# Patient Record
Sex: FEMALE | Race: WHITE | Employment: OTHER | URBAN - METROPOLITAN AREA
[De-identification: names, ages, dates, MRNs, and addresses within clinical notes are randomized per-mention and may not be internally consistent; named-entity substitution may affect disease eponyms.]

---

## 2018-07-03 ENCOUNTER — HOSPITAL ENCOUNTER (OUTPATIENT)
Dept: OTHER | Age: 78
Discharge: HOME | End: 2018-07-31
Attending: INTERNAL MEDICINE | Admitting: INTERNAL MEDICINE

## 2018-07-03 ASSESSMENT — PAIN SCALES - QUEBEC BACK PAIN DISABILITY SCALE
WALK A FEW BLOCKS OR 300 TO 400M: 5
LIFT AND CARRY A HEAVY SUITCASE: 4
RIDE IN A CAR: 2
SIT IN A CHAIR FOR SEVERAL HOURS: 2
MOVE A CHAIR: 2
CARRY TWO BAGS OF GROCERIES: 3
GET OUT OF BED: 1
TOTAL SCORE: 45
WALK SEVERAL KILOMETERS  OR MILES: 5
PUT ON SOCKS OR PANYHOSE: 1
PULL OR PUSH HEAVY DOORS: 3
THROW A BALL: 0
QUEBEC CMS MODIFIER: CK
SLEEP THROUGH THE NIGHT: 0
REACH UP TO HIGH SHELVES: 2
BEND OVER TO CLEAN THE BATHTUB: 2
MAKE YOUR BED: 4
CLIMB ONE FLIGHT OF STAIRS: 0
STAND UP FOR 20 TO 30 MINUTES: 3
RUN ONE BLOCK OR 100M: 5
QUEBEC DISABILITY INDEX: 40-59%
TURN OVER IN BED: 1
TAKE FOOD OUT OF THE REFRIGERATOR: 0

## 2018-07-03 NOTE — PROGRESS NOTES
Physical Therapy  Initial Assessment  Date: 7/3/2018  Patient Name: Shantel Pack  MRN: [id number]  : 1940     Treatment Diagnosis: low back pain, R hip and LE pain; dec core strength; dec ROM/flexibility     Restrictions  Position Activity Restriction  Other position/activity restrictions: low fall risk     Subjective   General  Chart Reviewed: Yes  Patient assessed for rehabilitation services?: Yes  Additional Pertinent Hx: spinal stenosis, L5-S1 Herniation, OA, spondylosis, comp fx t spine; kyphosis; neck problems; osteoporosis   Family / Caregiver Present: No  Referring Practitioner: Mike Brink  Referral Date : 18  Diagnosis: R SIJ dysfunction  PT Visit Information  Onset Date: 04/15/18  PT Insurance Information: medicare  Total # of Visits Approved: 8  Total # of Visits to Date: 1  Progress Note Counter: 1/10  Subjective  Subjective: Pt with h/o ongoing intermittent back pain for years. Recent flare up in April. Intermittent N/T on R but constant on L due to HLD years ago L5-S1. Pain reports pain up to 5-6/10 across LB and into R hip. Relief with heat. Objective     Observation/Palpation  Palpation: no TTP   Observation: thoracic kyphosis     Spine  Lumbar: flex 57 no pain; ext 15 no pain ; L SB 21 no pain;  R SB 16    Strength RLE  Strength RLE: WFL  Comment: bridge - stiffness only   Strength LLE  Strength LLE: WFL     Additional Measures  Flexibility: L HS and SKC max tightness with sharp pains R side, L pirif min tight; R HS -25, SKC and pirif min tightness/uncomfortable   Special Tests: possible post rot R side   Other: man p-tx sore initially but then felt good                       Assessment   Conditions Requiring Skilled Therapeutic Intervention  Body structures, Functions, Activity limitations: Decreased functional mobility ; Decreased ADL status; Decreased ROM; Decreased strength;Decreased high-level IADLs;Decreased balance;Decreased endurance  Assessment: prior level

## 2018-07-05 ENCOUNTER — HOSPITAL ENCOUNTER (OUTPATIENT)
Dept: PHYSICAL THERAPY | Age: 78
Discharge: HOME | End: 2018-07-06
Admitting: INTERNAL MEDICINE

## 2018-07-10 ENCOUNTER — HOSPITAL ENCOUNTER (OUTPATIENT)
Dept: PHYSICAL THERAPY | Age: 78
Discharge: HOME | End: 2018-07-11
Admitting: INTERNAL MEDICINE

## 2018-07-12 ENCOUNTER — HOSPITAL ENCOUNTER (OUTPATIENT)
Dept: PHYSICAL THERAPY | Age: 78
Discharge: HOME | End: 2018-07-13
Admitting: INTERNAL MEDICINE

## 2018-07-12 NOTE — FLOWSHEET NOTE
Physical Therapy Daily Treatment Note  Date:  2018    Patient Name:  Haseeb Jones    :  1940  MRN: [id number]    Restrictions/Precautions: Position Activity Restriction  Other position/activity restrictions: low fall risk     Pertinent Medical History: Additional Pertinent Hx: spinal stenosis, L5-S1 Herniation, OA, spondylosis, comp fx t spine; kyphosis; neck problems; osteoporosis     Medical/Treatment Diagnosis Information:  · Diagnosis: R SIJ dysfunction  · Treatment Diagnosis: low back pain, R hip and LE pain; dec core strength; dec ROM/flexibility     Insurance/Certification information:  PT Insurance Information: medicare  Physician Information:  Referring Practitioner: Amaris Monday  Plan of care signed (Y/N): Faxed to Md    Visit# / total visits:   - G code at 10   Pain level: 3/10     G-Code (if applicable):      Date / Visit # G-Code Applied:  /  PT G-Codes  Functional Assessment Tool Used: [place]  Score: 45/40-59%  Functional Limitation: Changing and maintaining body position  Changing and Maintaining Body Position Current Status (): At least 40 percent but less than 60 percent impaired, limited or restricted  Changing and Maintaining Body Position Goal Status (): At least 20 percent but less than 40 percent impaired, limited or restricted    Progress Note: []  Yes  [x]  No  Next due by: Visit #10      History of Injury: Subjective  Subjective: Pt with h/o ongoing intermittent back pain for years. Recent flare up in April. Intermittent N/T on R but constant on L due to HLD years ago L5-S1. Pain reports pain up to 5-6/10 across LB and into R hip. Relief with heat. Subjective:  Reports R LBP and R lower quadrant pain/ tenderness.      Objective:  Observation:   Test measurements:      Exercises:  Exercise/Equipment Resistance/Repetitions Other comments   MET for R post rot Deferred today        TA  PPT X 5 with deep breathing  X 5 Max cues  Increased pain after ; with

## 2018-07-17 ENCOUNTER — HOSPITAL ENCOUNTER (OUTPATIENT)
Dept: PHYSICAL THERAPY | Age: 78
Discharge: HOME | End: 2018-07-18
Admitting: INTERNAL MEDICINE

## 2018-08-01 ENCOUNTER — HOSPITAL ENCOUNTER (OUTPATIENT)
Dept: OTHER | Age: 78
Discharge: HOME | End: 2018-08-31
Attending: INTERNAL MEDICINE | Admitting: INTERNAL MEDICINE

## 2019-07-19 ENCOUNTER — HOSPITAL ENCOUNTER (EMERGENCY)
Age: 79
Discharge: LEFT BEFORE DISCHARGE | End: 2019-07-19
Attending: EMERGENCY MEDICINE
Payer: MEDICARE

## 2019-07-19 ENCOUNTER — APPOINTMENT (OUTPATIENT)
Dept: GENERAL RADIOLOGY | Age: 79
End: 2019-07-19
Payer: MEDICARE

## 2019-07-19 ENCOUNTER — APPOINTMENT (OUTPATIENT)
Dept: CT IMAGING | Age: 79
End: 2019-07-19
Payer: MEDICARE

## 2019-07-19 VITALS
WEIGHT: 163.58 LBS | DIASTOLIC BLOOD PRESSURE: 76 MMHG | RESPIRATION RATE: 16 BRPM | BODY MASS INDEX: 30.1 KG/M2 | HEART RATE: 75 BPM | HEIGHT: 62 IN | TEMPERATURE: 97.9 F | SYSTOLIC BLOOD PRESSURE: 154 MMHG | OXYGEN SATURATION: 98 %

## 2019-07-19 DIAGNOSIS — S09.90XA INJURY OF HEAD, INITIAL ENCOUNTER: Primary | ICD-10-CM

## 2019-07-19 PROCEDURE — 72125 CT NECK SPINE W/O DYE: CPT

## 2019-07-19 PROCEDURE — 4500000002 HC ER NO CHARGE

## 2019-07-19 PROCEDURE — 73030 X-RAY EXAM OF SHOULDER: CPT

## 2019-07-19 PROCEDURE — 70450 CT HEAD/BRAIN W/O DYE: CPT

## 2019-07-19 ASSESSMENT — PAIN DESCRIPTION - DESCRIPTORS: DESCRIPTORS: DISCOMFORT

## 2019-07-19 ASSESSMENT — PAIN DESCRIPTION - PAIN TYPE: TYPE: ACUTE PAIN

## 2019-07-19 ASSESSMENT — PAIN DESCRIPTION - LOCATION: LOCATION: HEAD

## 2019-07-19 ASSESSMENT — PAIN SCALES - GENERAL: PAINLEVEL_OUTOF10: 2

## 2019-07-19 NOTE — ED TRIAGE NOTES
Pt to Er states didnt see curb and tripped landing on forehead. 81mg asa daily. +brusing. +abrasion. pt reports chronic neck pain. allergic to tetanus shot.

## 2019-07-22 ASSESSMENT — ENCOUNTER SYMPTOMS
SHORTNESS OF BREATH: 0
ABDOMINAL PAIN: 0
PHOTOPHOBIA: 0
VOICE CHANGE: 0
COLOR CHANGE: 1
VOMITING: 0
NAUSEA: 0

## 2020-06-03 ENCOUNTER — APPOINTMENT (OUTPATIENT)
Dept: CT IMAGING | Age: 80
DRG: 159 | End: 2020-06-03
Payer: MEDICARE

## 2020-06-03 ENCOUNTER — HOSPITAL ENCOUNTER (INPATIENT)
Age: 80
LOS: 2 days | Discharge: ANOTHER HOSPITAL | DRG: 159 | End: 2020-06-05
Attending: FAMILY MEDICINE | Admitting: FAMILY MEDICINE
Payer: MEDICARE

## 2020-06-03 PROBLEM — E78.5 HYPERLIPIDEMIA: Status: ACTIVE | Noted: 2020-06-03

## 2020-06-03 PROBLEM — I10 HYPERTENSION: Status: ACTIVE | Noted: 2020-06-03

## 2020-06-03 PROBLEM — E03.9 HYPOTHYROID: Status: ACTIVE | Noted: 2020-06-03

## 2020-06-03 PROBLEM — L02.91 PHLEGMON: Status: ACTIVE | Noted: 2020-06-03

## 2020-06-03 PROBLEM — K12.2 ORAL ABSCESS: Status: ACTIVE | Noted: 2020-06-03

## 2020-06-03 PROBLEM — R25.2 TRISMUS: Status: ACTIVE | Noted: 2020-06-03

## 2020-06-03 LAB
A/G RATIO: 1.4 (ref 1.1–2.2)
ALBUMIN SERPL-MCNC: 4.4 G/DL (ref 3.4–5)
ALP BLD-CCNC: 81 U/L (ref 40–129)
ALT SERPL-CCNC: 13 U/L (ref 10–40)
ANION GAP SERPL CALCULATED.3IONS-SCNC: 14 MMOL/L (ref 3–16)
AST SERPL-CCNC: 15 U/L (ref 15–37)
BASOPHILS ABSOLUTE: 0.1 K/UL (ref 0–0.2)
BASOPHILS RELATIVE PERCENT: 0.6 %
BILIRUB SERPL-MCNC: 1.4 MG/DL (ref 0–1)
BUN BLDV-MCNC: 14 MG/DL (ref 7–20)
CALCIUM SERPL-MCNC: 10.1 MG/DL (ref 8.3–10.6)
CHLORIDE BLD-SCNC: 99 MMOL/L (ref 99–110)
CO2: 26 MMOL/L (ref 21–32)
CREAT SERPL-MCNC: 0.6 MG/DL (ref 0.6–1.2)
EOSINOPHILS ABSOLUTE: 0 K/UL (ref 0–0.6)
EOSINOPHILS RELATIVE PERCENT: 0.1 %
GFR AFRICAN AMERICAN: >60
GFR NON-AFRICAN AMERICAN: >60
GLOBULIN: 3.1 G/DL
GLUCOSE BLD-MCNC: 112 MG/DL (ref 70–99)
HCT VFR BLD CALC: 40.2 % (ref 36–48)
HEMOGLOBIN: 13.7 G/DL (ref 12–16)
LACTIC ACID, SEPSIS: 0.9 MMOL/L (ref 0.4–1.9)
LYMPHOCYTES ABSOLUTE: 2.3 K/UL (ref 1–5.1)
LYMPHOCYTES RELATIVE PERCENT: 21.2 %
MCH RBC QN AUTO: 31 PG (ref 26–34)
MCHC RBC AUTO-ENTMCNC: 34 G/DL (ref 31–36)
MCV RBC AUTO: 91.2 FL (ref 80–100)
MONOCYTES ABSOLUTE: 1 K/UL (ref 0–1.3)
MONOCYTES RELATIVE PERCENT: 9.2 %
NEUTROPHILS ABSOLUTE: 7.6 K/UL (ref 1.7–7.7)
NEUTROPHILS RELATIVE PERCENT: 68.9 %
PDW BLD-RTO: 13.2 % (ref 12.4–15.4)
PLATELET # BLD: 234 K/UL (ref 135–450)
PMV BLD AUTO: 7.8 FL (ref 5–10.5)
POTASSIUM SERPL-SCNC: 4.8 MMOL/L (ref 3.5–5.1)
RBC # BLD: 4.41 M/UL (ref 4–5.2)
SODIUM BLD-SCNC: 139 MMOL/L (ref 136–145)
TOTAL PROTEIN: 7.5 G/DL (ref 6.4–8.2)
WBC # BLD: 11 K/UL (ref 4–11)

## 2020-06-03 PROCEDURE — 96375 TX/PRO/DX INJ NEW DRUG ADDON: CPT

## 2020-06-03 PROCEDURE — 6360000002 HC RX W HCPCS: Performed by: NURSE PRACTITIONER

## 2020-06-03 PROCEDURE — 1200000000 HC SEMI PRIVATE

## 2020-06-03 PROCEDURE — 83605 ASSAY OF LACTIC ACID: CPT

## 2020-06-03 PROCEDURE — 99284 EMERGENCY DEPT VISIT MOD MDM: CPT

## 2020-06-03 PROCEDURE — 6360000002 HC RX W HCPCS: Performed by: FAMILY MEDICINE

## 2020-06-03 PROCEDURE — 2500000003 HC RX 250 WO HCPCS: Performed by: NURSE PRACTITIONER

## 2020-06-03 PROCEDURE — 70491 CT SOFT TISSUE NECK W/DYE: CPT

## 2020-06-03 PROCEDURE — 80053 COMPREHEN METABOLIC PANEL: CPT

## 2020-06-03 PROCEDURE — 85025 COMPLETE CBC W/AUTO DIFF WBC: CPT

## 2020-06-03 PROCEDURE — 6360000004 HC RX CONTRAST MEDICATION: Performed by: NURSE PRACTITIONER

## 2020-06-03 PROCEDURE — 6370000000 HC RX 637 (ALT 250 FOR IP): Performed by: FAMILY MEDICINE

## 2020-06-03 PROCEDURE — 2580000003 HC RX 258: Performed by: NURSE PRACTITIONER

## 2020-06-03 PROCEDURE — 2580000003 HC RX 258: Performed by: FAMILY MEDICINE

## 2020-06-03 PROCEDURE — 94761 N-INVAS EAR/PLS OXIMETRY MLT: CPT

## 2020-06-03 PROCEDURE — 96365 THER/PROPH/DIAG IV INF INIT: CPT

## 2020-06-03 PROCEDURE — 2500000003 HC RX 250 WO HCPCS: Performed by: FAMILY MEDICINE

## 2020-06-03 PROCEDURE — 87040 BLOOD CULTURE FOR BACTERIA: CPT

## 2020-06-03 PROCEDURE — 2700000000 HC OXYGEN THERAPY PER DAY

## 2020-06-03 PROCEDURE — 36415 COLL VENOUS BLD VENIPUNCTURE: CPT

## 2020-06-03 RX ORDER — ATORVASTATIN CALCIUM 40 MG/1
40 TABLET, FILM COATED ORAL DAILY
Status: DISCONTINUED | OUTPATIENT
Start: 2020-06-03 | End: 2020-06-05 | Stop reason: HOSPADM

## 2020-06-03 RX ORDER — SPIRONOLACTONE 25 MG/1
25 TABLET ORAL 2 TIMES DAILY
Status: DISCONTINUED | OUTPATIENT
Start: 2020-06-03 | End: 2020-06-05 | Stop reason: HOSPADM

## 2020-06-03 RX ORDER — ACETAMINOPHEN 650 MG/1
650 SUPPOSITORY RECTAL EVERY 6 HOURS PRN
Status: DISCONTINUED | OUTPATIENT
Start: 2020-06-03 | End: 2020-06-05 | Stop reason: HOSPADM

## 2020-06-03 RX ORDER — POLYVINYL ALCOHOL 14 MG/ML
1 SOLUTION/ DROPS OPHTHALMIC PRN
Status: DISCONTINUED | OUTPATIENT
Start: 2020-06-03 | End: 2020-06-05 | Stop reason: HOSPADM

## 2020-06-03 RX ORDER — POLYETHYLENE GLYCOL 3350 17 G/17G
17 POWDER, FOR SOLUTION ORAL DAILY PRN
Status: DISCONTINUED | OUTPATIENT
Start: 2020-06-03 | End: 2020-06-05 | Stop reason: HOSPADM

## 2020-06-03 RX ORDER — LATANOPROST 50 UG/ML
1 SOLUTION/ DROPS OPHTHALMIC NIGHTLY
COMMUNITY

## 2020-06-03 RX ORDER — CLINDAMYCIN PHOSPHATE 600 MG/50ML
600 INJECTION INTRAVENOUS ONCE
Status: COMPLETED | OUTPATIENT
Start: 2020-06-03 | End: 2020-06-03

## 2020-06-03 RX ORDER — DEXAMETHASONE SODIUM PHOSPHATE 10 MG/ML
10 INJECTION INTRAMUSCULAR; INTRAVENOUS ONCE
Status: COMPLETED | OUTPATIENT
Start: 2020-06-03 | End: 2020-06-03

## 2020-06-03 RX ORDER — VALSARTAN 80 MG/1
80 TABLET ORAL DAILY
Status: DISCONTINUED | OUTPATIENT
Start: 2020-06-03 | End: 2020-06-05 | Stop reason: HOSPADM

## 2020-06-03 RX ORDER — SODIUM CHLORIDE 0.9 % (FLUSH) 0.9 %
10 SYRINGE (ML) INJECTION PRN
Status: DISCONTINUED | OUTPATIENT
Start: 2020-06-03 | End: 2020-06-05 | Stop reason: HOSPADM

## 2020-06-03 RX ORDER — ASPIRIN 81 MG/1
81 TABLET ORAL DAILY
Status: DISCONTINUED | OUTPATIENT
Start: 2020-06-03 | End: 2020-06-05 | Stop reason: HOSPADM

## 2020-06-03 RX ORDER — ACETAMINOPHEN 325 MG/1
650 TABLET ORAL EVERY 6 HOURS PRN
Status: DISCONTINUED | OUTPATIENT
Start: 2020-06-03 | End: 2020-06-05 | Stop reason: HOSPADM

## 2020-06-03 RX ORDER — LEVOTHYROXINE SODIUM 88 UG/1
88 TABLET ORAL DAILY
COMMUNITY

## 2020-06-03 RX ORDER — CLINDAMYCIN PHOSPHATE 600 MG/50ML
600 INJECTION INTRAVENOUS EVERY 8 HOURS
Status: DISCONTINUED | OUTPATIENT
Start: 2020-06-03 | End: 2020-06-05 | Stop reason: HOSPADM

## 2020-06-03 RX ORDER — ONDANSETRON 2 MG/ML
4 INJECTION INTRAMUSCULAR; INTRAVENOUS EVERY 30 MIN PRN
Status: DISCONTINUED | OUTPATIENT
Start: 2020-06-03 | End: 2020-06-03

## 2020-06-03 RX ORDER — MORPHINE SULFATE 2 MG/ML
2 INJECTION, SOLUTION INTRAMUSCULAR; INTRAVENOUS ONCE
Status: COMPLETED | OUTPATIENT
Start: 2020-06-03 | End: 2020-06-03

## 2020-06-03 RX ORDER — LATANOPROST 50 UG/ML
1 SOLUTION/ DROPS OPHTHALMIC NIGHTLY
Status: DISCONTINUED | OUTPATIENT
Start: 2020-06-03 | End: 2020-06-05 | Stop reason: HOSPADM

## 2020-06-03 RX ORDER — ATORVASTATIN CALCIUM 40 MG/1
40 TABLET, FILM COATED ORAL NIGHTLY
COMMUNITY

## 2020-06-03 RX ORDER — SODIUM CHLORIDE 0.9 % (FLUSH) 0.9 %
10 SYRINGE (ML) INJECTION EVERY 12 HOURS SCHEDULED
Status: DISCONTINUED | OUTPATIENT
Start: 2020-06-03 | End: 2020-06-05 | Stop reason: HOSPADM

## 2020-06-03 RX ORDER — KETOROLAC TROMETHAMINE 15 MG/ML
15 INJECTION, SOLUTION INTRAMUSCULAR; INTRAVENOUS ONCE
Status: COMPLETED | OUTPATIENT
Start: 2020-06-03 | End: 2020-06-03

## 2020-06-03 RX ORDER — ONDANSETRON 2 MG/ML
4 INJECTION INTRAMUSCULAR; INTRAVENOUS EVERY 6 HOURS PRN
Status: DISCONTINUED | OUTPATIENT
Start: 2020-06-03 | End: 2020-06-05 | Stop reason: HOSPADM

## 2020-06-03 RX ORDER — LEVOTHYROXINE SODIUM 88 UG/1
88 TABLET ORAL DAILY
Status: DISCONTINUED | OUTPATIENT
Start: 2020-06-04 | End: 2020-06-05 | Stop reason: HOSPADM

## 2020-06-03 RX ORDER — 0.9 % SODIUM CHLORIDE 0.9 %
1000 INTRAVENOUS SOLUTION INTRAVENOUS ONCE
Status: COMPLETED | OUTPATIENT
Start: 2020-06-03 | End: 2020-06-03

## 2020-06-03 RX ORDER — SODIUM CHLORIDE 9 MG/ML
INJECTION, SOLUTION INTRAVENOUS CONTINUOUS
Status: DISCONTINUED | OUTPATIENT
Start: 2020-06-04 | End: 2020-06-05 | Stop reason: HOSPADM

## 2020-06-03 RX ORDER — VALSARTAN 80 MG/1
80 TABLET ORAL DAILY
COMMUNITY

## 2020-06-03 RX ORDER — ACETAMINOPHEN 500 MG
1000 TABLET ORAL ONCE
Status: DISCONTINUED | OUTPATIENT
Start: 2020-06-03 | End: 2020-06-05 | Stop reason: HOSPADM

## 2020-06-03 RX ORDER — MORPHINE SULFATE 4 MG/ML
4 INJECTION, SOLUTION INTRAMUSCULAR; INTRAVENOUS ONCE
Status: COMPLETED | OUTPATIENT
Start: 2020-06-03 | End: 2020-06-03

## 2020-06-03 RX ORDER — ASPIRIN 81 MG/1
81 TABLET ORAL DAILY
COMMUNITY

## 2020-06-03 RX ORDER — PROMETHAZINE HYDROCHLORIDE 25 MG/1
12.5 TABLET ORAL EVERY 6 HOURS PRN
Status: DISCONTINUED | OUTPATIENT
Start: 2020-06-03 | End: 2020-06-05 | Stop reason: HOSPADM

## 2020-06-03 RX ORDER — SPIRONOLACTONE 25 MG/1
25 TABLET ORAL 2 TIMES DAILY
COMMUNITY

## 2020-06-03 RX ADMIN — CLINDAMYCIN PHOSPHATE 600 MG: 600 INJECTION, SOLUTION INTRAVENOUS at 14:43

## 2020-06-03 RX ADMIN — ONDANSETRON 4 MG: 2 INJECTION INTRAMUSCULAR; INTRAVENOUS at 21:52

## 2020-06-03 RX ADMIN — ATORVASTATIN CALCIUM 40 MG: 40 TABLET, FILM COATED ORAL at 20:40

## 2020-06-03 RX ADMIN — ASPIRIN 81 MG: 81 TABLET, COATED ORAL at 20:40

## 2020-06-03 RX ADMIN — CLINDAMYCIN PHOSPHATE 600 MG: 600 INJECTION, SOLUTION INTRAVENOUS at 21:52

## 2020-06-03 RX ADMIN — KETOROLAC TROMETHAMINE 15 MG: 15 INJECTION, SOLUTION INTRAMUSCULAR; INTRAVENOUS at 21:52

## 2020-06-03 RX ADMIN — MORPHINE SULFATE 2 MG: 2 INJECTION, SOLUTION INTRAMUSCULAR; INTRAVENOUS at 22:01

## 2020-06-03 RX ADMIN — DEXAMETHASONE SODIUM PHOSPHATE 10 MG: 10 INJECTION INTRAMUSCULAR; INTRAVENOUS at 21:52

## 2020-06-03 RX ADMIN — ONDANSETRON 4 MG: 2 INJECTION, SOLUTION INTRAMUSCULAR; INTRAVENOUS at 14:27

## 2020-06-03 RX ADMIN — Medication 10 ML: at 20:41

## 2020-06-03 RX ADMIN — MORPHINE SULFATE 4 MG: 4 INJECTION, SOLUTION INTRAMUSCULAR; INTRAVENOUS at 17:38

## 2020-06-03 RX ADMIN — SPIRONOLACTONE 25 MG: 25 TABLET ORAL at 20:40

## 2020-06-03 RX ADMIN — IOPAMIDOL 100 ML: 755 INJECTION, SOLUTION INTRAVENOUS at 15:25

## 2020-06-03 RX ADMIN — ENOXAPARIN SODIUM 40 MG: 40 INJECTION SUBCUTANEOUS at 20:41

## 2020-06-03 RX ADMIN — LATANOPROST 1 DROP: 50 SOLUTION OPHTHALMIC at 21:52

## 2020-06-03 RX ADMIN — VALSARTAN 80 MG: 80 TABLET, FILM COATED ORAL at 20:40

## 2020-06-03 RX ADMIN — SODIUM CHLORIDE 1000 ML: 9 INJECTION, SOLUTION INTRAVENOUS at 14:27

## 2020-06-03 RX ADMIN — MORPHINE SULFATE 4 MG: 4 INJECTION, SOLUTION INTRAMUSCULAR; INTRAVENOUS at 14:31

## 2020-06-03 ASSESSMENT — PAIN DESCRIPTION - LOCATION
LOCATION: MOUTH
LOCATION: MOUTH;JAW
LOCATION: JAW;MOUTH

## 2020-06-03 ASSESSMENT — ENCOUNTER SYMPTOMS
NAUSEA: 0
DIARRHEA: 0
SHORTNESS OF BREATH: 0
VOMITING: 0
SORE THROAT: 1
CHEST TIGHTNESS: 0
ABDOMINAL PAIN: 0
TROUBLE SWALLOWING: 1
FACIAL SWELLING: 1

## 2020-06-03 ASSESSMENT — PAIN DESCRIPTION - ORIENTATION
ORIENTATION: RIGHT

## 2020-06-03 ASSESSMENT — PAIN SCALES - GENERAL
PAINLEVEL_OUTOF10: 10
PAINLEVEL_OUTOF10: 9
PAINLEVEL_OUTOF10: 10

## 2020-06-03 ASSESSMENT — PAIN DESCRIPTION - FREQUENCY
FREQUENCY: CONTINUOUS
FREQUENCY: CONTINUOUS

## 2020-06-03 ASSESSMENT — PAIN DESCRIPTION - PAIN TYPE
TYPE: ACUTE PAIN

## 2020-06-03 ASSESSMENT — PAIN DESCRIPTION - PROGRESSION
CLINICAL_PROGRESSION: NOT CHANGED
CLINICAL_PROGRESSION: GRADUALLY WORSENING
CLINICAL_PROGRESSION: NOT CHANGED

## 2020-06-03 ASSESSMENT — PAIN DESCRIPTION - DESCRIPTORS
DESCRIPTORS: THROBBING;ACHING
DESCRIPTORS: ACHING;THROBBING
DESCRIPTORS: ACHING;THROBBING

## 2020-06-03 ASSESSMENT — PAIN - FUNCTIONAL ASSESSMENT
PAIN_FUNCTIONAL_ASSESSMENT: ACTIVITIES ARE NOT PREVENTED
PAIN_FUNCTIONAL_ASSESSMENT: ACTIVITIES ARE NOT PREVENTED

## 2020-06-03 ASSESSMENT — PAIN DESCRIPTION - ONSET
ONSET: ON-GOING
ONSET: ON-GOING
ONSET: PROGRESSIVE

## 2020-06-03 NOTE — ED PROVIDER NOTES
Review of Systems   Constitutional: Positive for chills. Negative for activity change and fever. HENT: Positive for dental problem, facial swelling, sore throat and trouble swallowing. Respiratory: Negative for chest tightness and shortness of breath. Cardiovascular: Negative for chest pain. Gastrointestinal: Negative for abdominal pain, diarrhea, nausea and vomiting. Genitourinary: Negative for dysuria. All other systems reviewed and are negative. Positives and Pertinent negatives as per HPI. Except as noted above in the ROS, all other systems were reviewed and negative. PAST MEDICAL HISTORY     Past Medical History:   Diagnosis Date    Hyperlipidemia     Hypertension     Hypothyroid          SURGICAL HISTORY   No past surgical history on file. CURRENTMEDICATIONS       Previous Medications    ASPIRIN 81 MG EC TABLET    Take 81 mg by mouth daily    ATORVASTATIN (LIPITOR) 40 MG TABLET    Take 40 mg by mouth daily    LATANOPROST (XALATAN) 0.005 % OPHTHALMIC SOLUTION    Place 1 drop into the right eye nightly    LEVOTHYROXINE (SYNTHROID) 88 MCG TABLET    Take 88 mcg by mouth Daily    SPIRONOLACTONE (ALDACTONE) 25 MG TABLET    Take 25 mg by mouth 2 times daily Indications: not sure of dose    VALSARTAN (DIOVAN) 80 MG TABLET    Take 80 mg by mouth daily         ALLERGIES     Flu virus vaccine; Penicillins; Tetanus toxoid, adsorbed; Epinephrine; and Lisinopril    FAMILYHISTORY     No family history on file.        SOCIAL HISTORY       Social History     Tobacco Use    Smoking status: Never Smoker    Smokeless tobacco: Never Used   Substance Use Topics    Alcohol use: Yes     Comment: occ    Drug use: Never       SCREENINGS             PHYSICAL EXAM    (up to 7 for level 4, 8 or more for level 5)     ED Triage Vitals [06/03/20 1337]   BP Temp Temp Source Pulse Resp SpO2 Height Weight   136/65 99.1 °F (37.3 °C) Oral 74 18 96 % 5' 2\" (1.575 m) 160 lb 15 oz (73 kg)       Physical Exam  Vitals signs and nursing note reviewed. Constitutional:       Appearance: She is well-developed. She is not diaphoretic. HENT:      Head: Normocephalic and atraumatic. Jaw: Trismus, tenderness, swelling and pain on movement present. Right Ear: Tympanic membrane and external ear normal.      Left Ear: Tympanic membrane and external ear normal.   Eyes:      General:         Right eye: No discharge. Left eye: No discharge. Neck:      Musculoskeletal: Normal range of motion and neck supple. Vascular: No JVD. Cardiovascular:      Rate and Rhythm: Normal rate and regular rhythm. Pulses: Normal pulses. Heart sounds: Normal heart sounds. Pulmonary:      Effort: Pulmonary effort is normal. No respiratory distress. Breath sounds: Normal breath sounds. Abdominal:      Palpations: Abdomen is soft. Musculoskeletal: Normal range of motion. Skin:     General: Skin is warm and dry. Coloration: Skin is not pale. Neurological:      Mental Status: She is alert and oriented to person, place, and time.    Psychiatric:         Behavior: Behavior normal.         DIAGNOSTIC RESULTS   LABS:    Labs Reviewed   COMPREHENSIVE METABOLIC PANEL - Abnormal; Notable for the following components:       Result Value    Glucose 112 (*)     Total Bilirubin 1.4 (*)     All other components within normal limits    Narrative:     Performed at:  [facility]  [address] [place], [facility]   Phone [phone]   CULTURE, BLOOD 1   CULTURE, BLOOD 2   CBC WITH AUTO DIFFERENTIAL    Narrative:     Performed at:  [facility]  [address] [place], [facility]   Phone [phone]   LACTATE, SEPSIS    Narrative:     Performed at:  [address] [place] [address] Laboratory  [address] [place], [facility]   Phone [phone]   LACTATE, SEPSIS       All other labs were within normal range or not returned as of this dictation. EKG: All EKG's are interpreted by the Emergency Department Physician in the absence of a cardiologist.  Please see their note for interpretation of EKG. RADIOLOGY:   Non-plain film images such as CT, Ultrasound and MRI are read by the radiologist. Plain radiographic images are visualized and preliminarily interpreted by the ED Provider with the below findings:        Interpretation per the Radiologist below, if available at the time of this note:    CT SOFT TISSUE NECK W CONTRAST   Final Result   Soft tissue phlegmon along the lingual aspect of the right mandibular angle   and ramus. No discrete rim enhancing fluid collection to suggest drainable   abscess. No results found.         PROCEDURES   Unless otherwise noted below, none     Procedures    CRITICAL CARE TIME   n/a    CONSULTS:  None      EMERGENCY DEPARTMENT COURSE and DIFFERENTIAL DIAGNOSIS/MDM:   Vitals:    Vitals:    06/03/20 1337 06/03/20 1503 06/03/20 1805   BP: 136/65 (!) 142/63 128/74   Pulse: 74 78 68   Resp: 18  16   Temp: 99.1 °F (37.3 °C)  99.3 °F (37.4 °C)   TempSrc: Oral  Oral   SpO2: 96%  96%   Weight: 160 lb 15 oz (73 kg)     Height: 5' 2\" (1.575 m)         Patient was given the following medications:  Medications   ondansetron (ZOFRAN) injection 4 mg (4 mg Intravenous Given 6/3/20 1427)   acetaminophen (TYLENOL) tablet 1,000 mg (1,000 mg Oral Not Given 6/3/20 1803)   clindamycin (CLEOCIN) 600 mg in dextrose 5 % 50 mL IVPB (0 mg Intravenous Stopped 6/3/20 1515)   morphine (PF) injection 4 mg (4 mg Intravenous Given 6/3/20 1431)   0.9 % sodium chloride bolus (0 mLs Intravenous Stopped 6/3/20 1634)   iopamidol (ISOVUE-370) 76 % injection 100 mL (100 mLs Intravenous Given 6/3/20 1525)   morphine (PF) injection 4 mg (4 mg Intravenous Given 6/3/20 1738)       Briefly, this is a 79year old female that presents to the emergency department today with complaint of right-sided

## 2020-06-03 NOTE — ED NOTES
Transferred to [facility] by [employer]  transport with belongings at side.    IV site unremarkable   Pain 9/10     West Arias RN  06/03/20 3164

## 2020-06-04 LAB
ANION GAP SERPL CALCULATED.3IONS-SCNC: 13 MMOL/L (ref 3–16)
BASOPHILS ABSOLUTE: 0 K/UL (ref 0–0.2)
BASOPHILS RELATIVE PERCENT: 0.2 %
BUN BLDV-MCNC: 17 MG/DL (ref 7–20)
CALCIUM SERPL-MCNC: 9.2 MG/DL (ref 8.3–10.6)
CHLORIDE BLD-SCNC: 101 MMOL/L (ref 99–110)
CO2: 24 MMOL/L (ref 21–32)
CREAT SERPL-MCNC: 0.7 MG/DL (ref 0.6–1.2)
EOSINOPHILS ABSOLUTE: 0 K/UL (ref 0–0.6)
EOSINOPHILS RELATIVE PERCENT: 0 %
GFR AFRICAN AMERICAN: >60
GFR NON-AFRICAN AMERICAN: >60
GLUCOSE BLD-MCNC: 140 MG/DL (ref 70–99)
HCT VFR BLD CALC: 38.9 % (ref 36–48)
HEMOGLOBIN: 13.3 G/DL (ref 12–16)
LYMPHOCYTES ABSOLUTE: 1.5 K/UL (ref 1–5.1)
LYMPHOCYTES RELATIVE PERCENT: 17.2 %
MCH RBC QN AUTO: 31.1 PG (ref 26–34)
MCHC RBC AUTO-ENTMCNC: 34.1 G/DL (ref 31–36)
MCV RBC AUTO: 91.3 FL (ref 80–100)
MONOCYTES ABSOLUTE: 0.1 K/UL (ref 0–1.3)
MONOCYTES RELATIVE PERCENT: 1 %
NEUTROPHILS ABSOLUTE: 7.3 K/UL (ref 1.7–7.7)
NEUTROPHILS RELATIVE PERCENT: 81.6 %
PDW BLD-RTO: 12.9 % (ref 12.4–15.4)
PLATELET # BLD: 240 K/UL (ref 135–450)
PMV BLD AUTO: 7.4 FL (ref 5–10.5)
POTASSIUM REFLEX MAGNESIUM: 4.6 MMOL/L (ref 3.5–5.1)
RBC # BLD: 4.26 M/UL (ref 4–5.2)
SODIUM BLD-SCNC: 138 MMOL/L (ref 136–145)
WBC # BLD: 9 K/UL (ref 4–11)

## 2020-06-04 PROCEDURE — 2580000003 HC RX 258: Performed by: NURSE PRACTITIONER

## 2020-06-04 PROCEDURE — 2500000003 HC RX 250 WO HCPCS: Performed by: FAMILY MEDICINE

## 2020-06-04 PROCEDURE — 6370000000 HC RX 637 (ALT 250 FOR IP): Performed by: FAMILY MEDICINE

## 2020-06-04 PROCEDURE — 85025 COMPLETE CBC W/AUTO DIFF WBC: CPT

## 2020-06-04 PROCEDURE — 6360000002 HC RX W HCPCS: Performed by: FAMILY MEDICINE

## 2020-06-04 PROCEDURE — 94761 N-INVAS EAR/PLS OXIMETRY MLT: CPT

## 2020-06-04 PROCEDURE — 1200000000 HC SEMI PRIVATE

## 2020-06-04 PROCEDURE — 6370000000 HC RX 637 (ALT 250 FOR IP): Performed by: INTERNAL MEDICINE

## 2020-06-04 PROCEDURE — 99221 1ST HOSP IP/OBS SF/LOW 40: CPT | Performed by: OTOLARYNGOLOGY

## 2020-06-04 PROCEDURE — 2700000000 HC OXYGEN THERAPY PER DAY

## 2020-06-04 PROCEDURE — 80048 BASIC METABOLIC PNL TOTAL CA: CPT

## 2020-06-04 PROCEDURE — 36415 COLL VENOUS BLD VENIPUNCTURE: CPT

## 2020-06-04 RX ORDER — MORPHINE SULFATE 4 MG/ML
4 INJECTION, SOLUTION INTRAMUSCULAR; INTRAVENOUS ONCE
Status: COMPLETED | OUTPATIENT
Start: 2020-06-04 | End: 2020-06-05

## 2020-06-04 RX ORDER — HYDROCODONE BITARTRATE AND ACETAMINOPHEN 5; 325 MG/1; MG/1
1 TABLET ORAL EVERY 6 HOURS PRN
Status: DISCONTINUED | OUTPATIENT
Start: 2020-06-04 | End: 2020-06-05 | Stop reason: HOSPADM

## 2020-06-04 RX ADMIN — SPIRONOLACTONE 25 MG: 25 TABLET ORAL at 10:46

## 2020-06-04 RX ADMIN — SODIUM CHLORIDE: 9 INJECTION, SOLUTION INTRAVENOUS at 00:59

## 2020-06-04 RX ADMIN — CLINDAMYCIN PHOSPHATE 600 MG: 600 INJECTION, SOLUTION INTRAVENOUS at 06:02

## 2020-06-04 RX ADMIN — SPIRONOLACTONE 25 MG: 25 TABLET ORAL at 17:36

## 2020-06-04 RX ADMIN — HYDROCODONE BITARTRATE AND ACETAMINOPHEN 1 TABLET: 5; 325 TABLET ORAL at 17:36

## 2020-06-04 RX ADMIN — ASPIRIN 81 MG: 81 TABLET, COATED ORAL at 10:46

## 2020-06-04 RX ADMIN — VALSARTAN 80 MG: 80 TABLET, FILM COATED ORAL at 10:45

## 2020-06-04 RX ADMIN — CLINDAMYCIN PHOSPHATE 600 MG: 600 INJECTION, SOLUTION INTRAVENOUS at 15:11

## 2020-06-04 RX ADMIN — LEVOTHYROXINE SODIUM 88 MCG: 0.09 TABLET ORAL at 06:02

## 2020-06-04 RX ADMIN — ENOXAPARIN SODIUM 40 MG: 40 INJECTION SUBCUTANEOUS at 22:44

## 2020-06-04 RX ADMIN — SODIUM CHLORIDE: 9 INJECTION, SOLUTION INTRAVENOUS at 13:45

## 2020-06-04 RX ADMIN — CLINDAMYCIN PHOSPHATE 600 MG: 600 INJECTION, SOLUTION INTRAVENOUS at 22:44

## 2020-06-04 RX ADMIN — ATORVASTATIN CALCIUM 40 MG: 40 TABLET, FILM COATED ORAL at 10:45

## 2020-06-04 RX ADMIN — ONDANSETRON 4 MG: 2 INJECTION INTRAMUSCULAR; INTRAVENOUS at 15:14

## 2020-06-04 RX ADMIN — LATANOPROST 1 DROP: 50 SOLUTION OPHTHALMIC at 22:44

## 2020-06-04 RX ADMIN — ACETAMINOPHEN 650 MG: 325 TABLET, FILM COATED ORAL at 22:50

## 2020-06-04 ASSESSMENT — PAIN DESCRIPTION - PROGRESSION
CLINICAL_PROGRESSION: NOT CHANGED
CLINICAL_PROGRESSION: NOT CHANGED

## 2020-06-04 ASSESSMENT — PAIN DESCRIPTION - FREQUENCY
FREQUENCY: CONTINUOUS
FREQUENCY: CONTINUOUS

## 2020-06-04 ASSESSMENT — PAIN DESCRIPTION - PAIN TYPE
TYPE: ACUTE PAIN
TYPE: ACUTE PAIN

## 2020-06-04 ASSESSMENT — PAIN DESCRIPTION - ORIENTATION
ORIENTATION: RIGHT
ORIENTATION: RIGHT

## 2020-06-04 ASSESSMENT — PAIN - FUNCTIONAL ASSESSMENT
PAIN_FUNCTIONAL_ASSESSMENT: PREVENTS OR INTERFERES SOME ACTIVE ACTIVITIES AND ADLS
PAIN_FUNCTIONAL_ASSESSMENT: PREVENTS OR INTERFERES SOME ACTIVE ACTIVITIES AND ADLS

## 2020-06-04 ASSESSMENT — PAIN SCALES - GENERAL
PAINLEVEL_OUTOF10: 9
PAINLEVEL_OUTOF10: 9

## 2020-06-04 ASSESSMENT — PAIN DESCRIPTION - LOCATION
LOCATION: JAW
LOCATION: JAW

## 2020-06-04 ASSESSMENT — PAIN DESCRIPTION - DESCRIPTORS
DESCRIPTORS: SHARP
DESCRIPTORS: SHARP

## 2020-06-04 ASSESSMENT — PAIN DESCRIPTION - ONSET
ONSET: ON-GOING
ONSET: ON-GOING

## 2020-06-04 NOTE — PROGRESS NOTES
Hospitalist Progress Note      PCP: Brennon Evreett    Date of Admission: 6/3/2020    Chief Complaint: mouth and neck pain    Hospital Course: 80-year-old female presented to the hospital with right-sided jaw pain along with some neck pain. CT of the soft tissue neck showed  phlegmon along the right mandibular angle and ramus    Subjective: Patient states that she feels much better since being on the antibiotics. States that the pain is gone and she can finally open her mouth up as well. Denies any nausea or vomiting. Medications:  Reviewed    Infusion Medications    sodium chloride 75 mL/hr at 06/04/20 0059     Scheduled Medications    acetaminophen  1,000 mg Oral Once    aspirin  81 mg Oral Daily    atorvastatin  40 mg Oral Daily    latanoprost  1 drop Right Eye Nightly    levothyroxine  88 mcg Oral Daily    spironolactone  25 mg Oral BID    valsartan  80 mg Oral Daily    sodium chloride flush  10 mL Intravenous 2 times per day    enoxaparin  40 mg Subcutaneous Nightly    clindamycin (CLEOCIN) IV  600 mg Intravenous Q8H     PRN Meds: sodium chloride flush, acetaminophen **OR** acetaminophen, polyethylene glycol, promethazine **OR** ondansetron, polyvinyl alcohol      Intake/Output Summary (Last 24 hours) at 6/4/2020 1305  Last data filed at 6/4/2020 1044  Gross per 24 hour   Intake 829.93 ml   Output --   Net 829.93 ml       Physical Exam Performed:    BP (!) 117/56   Pulse 57   Temp 97.7 °F (36.5 °C) (Oral)   Resp 18   Ht 5' 2\" (1.575 m)   Wt 163 lb 9.3 oz (74.2 kg)   SpO2 93%   BMI 29.92 kg/m²     General appearance: No apparent distress, appears stated age and cooperative. HEENT: Pupils equal, round, and reactive to light. Conjunctivae/corneas clear. Right-sided facial swelling noted over the right mandible: Nontender to palpation. No erythema or active drainage noted  Neck: Supple, with full range of motion. No jugular venous distention. Trachea midline.   Respiratory: Normal respiratory effort. Clear to auscultation, bilaterally without Rales/Wheezes/Rhonchi. Cardiovascular: Regular rate and rhythm with normal S1/S2 without murmurs, rubs or gallops. Abdomen: Soft, non-tender, non-distended with normal bowel sounds. Musculoskeletal: No clubbing, cyanosis or edema bilaterally. Full range of motion without deformity. Skin: Skin color, texture, turgor normal.  No rashes or lesions. Neurologic:  Neurovascularly intact without any focal sensory/motor deficits. Cranial nerves: II-XII intact, grossly non-focal.  Psychiatric: Alert and oriented, thought content appropriate, normal insight  Capillary Refill: Brisk,< 3 seconds   Peripheral Pulses: +2 palpable, equal bilaterally       Labs:   Recent Labs     06/03/20  1426 06/04/20  0532   WBC 11.0 9.0   HGB 13.7 13.3   HCT 40.2 38.9    240     Recent Labs     06/03/20  1426 06/04/20  0532    138   K 4.8 4.6   CL 99 101   CO2 26 24   BUN 14 17   CREATININE 0.6 0.7   CALCIUM 10.1 9.2     Recent Labs     06/03/20  1426   AST 15   ALT 13   BILITOT 1.4*   ALKPHOS 81     No results for input(s): INR in the last 72 hours. No results for input(s): [place] Done in the last 72 hours. Urinalysis:    No results found for: [place], BACTERIA, RBCUA, BLOODU, [address] [place]    Radiology:  CT SOFT TISSUE NECK W CONTRAST   Final Result   Soft tissue phlegmon along the lingual aspect of the right mandibular angle   and ramus. No discrete rim enhancing fluid collection to suggest drainable   abscess.                  Assessment/Plan:    Active Hospital Problems    Diagnosis    Oral abscess [K12.2]    Phlegmon [L02.91]    Trismus [R25.2]    Hyperlipidemia [E78.5]    Hypertension [I10]    Hypothyroid [E03.9]     Phlegmon along the right mandibular angle and ramus  -ENT was consulted who recommended continuing IV antibiotics for now as patient is significantly improved and may not need surgical intervention  -Continue

## 2020-06-04 NOTE — PROGRESS NOTES
Patient arrived on the floor, alert and oriented x4, VSS, sitting up in bed. Patient c/o right sided mouth/tooth pain with pain swallowing. She also states that she is having a little trouble swallowing and it is getting worse. Swelling is visible, O2 WNL. Patient denies n/v, diarrhea, SOB, but states that she does get nausea with the IV antibiotics that were given in the ED. Oriented patient to the room and call light, answered all questions. Patient denies further needs at this time. Bed in lowest position, non-slip socks on, call light within reach. Will continue to monitor pt needs.

## 2020-06-04 NOTE — PLAN OF CARE
Problem: Pain:  Description: Pain management should include both nonpharmacologic and pharmacologic interventions.   Goal: Pain level will decrease  Description: Pain level will decrease  Outcome: Ongoing  Goal: Control of acute pain  Description: Control of acute pain  Outcome: Ongoing  Goal: Control of chronic pain  Description: Control of chronic pain  Outcome: Ongoing  Goal: Patient's pain/discomfort is manageable  Description: Patient's pain/discomfort is manageable  Outcome: Ongoing     Problem: Infection:  Goal: Will remain free from infection  Description: Will remain free from infection  Outcome: Ongoing     Problem: Safety:  Goal: Free from accidental physical injury  Description: Free from accidental physical injury  Outcome: Ongoing  Goal: Free from intentional harm  Description: Free from intentional harm  Outcome: Ongoing     Problem: Daily Care:  Goal: Daily care needs are met  Description: Daily care needs are met  Outcome: Ongoing     Problem: Skin Integrity:  Goal: Skin integrity will stabilize  Description: Skin integrity will stabilize  Outcome: Ongoing     Problem: Discharge Planning:  Goal: Patients continuum of care needs are met  Description: Patients continuum of care needs are met  Outcome: Ongoing

## 2020-06-05 ENCOUNTER — HOSPITAL ENCOUNTER (INPATIENT)
Age: 80
LOS: 3 days | Discharge: HOME | DRG: 159 | End: 2020-06-08
Attending: INTERNAL MEDICINE | Admitting: INTERNAL MEDICINE
Payer: MEDICARE

## 2020-06-05 VITALS
SYSTOLIC BLOOD PRESSURE: 123 MMHG | HEART RATE: 63 BPM | OXYGEN SATURATION: 95 % | HEIGHT: 62 IN | TEMPERATURE: 98.5 F | RESPIRATION RATE: 20 BRPM | DIASTOLIC BLOOD PRESSURE: 70 MMHG | WEIGHT: 162.7 LBS | BODY MASS INDEX: 29.94 KG/M2

## 2020-06-05 LAB — SARS-COV-2, NAAT: NOT DETECTED

## 2020-06-05 PROCEDURE — 6370000000 HC RX 637 (ALT 250 FOR IP): Performed by: INTERNAL MEDICINE

## 2020-06-05 PROCEDURE — 6370000000 HC RX 637 (ALT 250 FOR IP): Performed by: FAMILY MEDICINE

## 2020-06-05 PROCEDURE — 2060000000 HC ICU INTERMEDIATE R&B

## 2020-06-05 PROCEDURE — 99223 1ST HOSP IP/OBS HIGH 75: CPT | Performed by: OTOLARYNGOLOGY

## 2020-06-05 PROCEDURE — 94761 N-INVAS EAR/PLS OXIMETRY MLT: CPT

## 2020-06-05 PROCEDURE — 2500000003 HC RX 250 WO HCPCS: Performed by: FAMILY MEDICINE

## 2020-06-05 PROCEDURE — 6360000002 HC RX W HCPCS: Performed by: INTERNAL MEDICINE

## 2020-06-05 PROCEDURE — 2580000003 HC RX 258: Performed by: INTERNAL MEDICINE

## 2020-06-05 PROCEDURE — 2500000003 HC RX 250 WO HCPCS: Performed by: INTERNAL MEDICINE

## 2020-06-05 PROCEDURE — 87040 BLOOD CULTURE FOR BACTERIA: CPT

## 2020-06-05 PROCEDURE — U0002 COVID-19 LAB TEST NON-CDC: HCPCS

## 2020-06-05 PROCEDURE — 6360000002 HC RX W HCPCS: Performed by: NURSE PRACTITIONER

## 2020-06-05 RX ORDER — ONDANSETRON 2 MG/ML
4 INJECTION INTRAMUSCULAR; INTRAVENOUS EVERY 6 HOURS PRN
Status: DISCONTINUED | OUTPATIENT
Start: 2020-06-05 | End: 2020-06-08 | Stop reason: HOSPADM

## 2020-06-05 RX ORDER — LEVOTHYROXINE SODIUM 88 UG/1
88 TABLET ORAL DAILY
Status: DISCONTINUED | OUTPATIENT
Start: 2020-06-05 | End: 2020-06-08 | Stop reason: HOSPADM

## 2020-06-05 RX ORDER — SODIUM CHLORIDE 0.9 % (FLUSH) 0.9 %
10 SYRINGE (ML) INJECTION PRN
Status: DISCONTINUED | OUTPATIENT
Start: 2020-06-05 | End: 2020-06-08 | Stop reason: HOSPADM

## 2020-06-05 RX ORDER — PROMETHAZINE HYDROCHLORIDE 25 MG/1
12.5 TABLET ORAL EVERY 6 HOURS PRN
Status: CANCELLED | OUTPATIENT
Start: 2020-06-05

## 2020-06-05 RX ORDER — DEXAMETHASONE SODIUM PHOSPHATE 4 MG/ML
4 INJECTION, SOLUTION INTRA-ARTICULAR; INTRALESIONAL; INTRAMUSCULAR; INTRAVENOUS; SOFT TISSUE EVERY 6 HOURS
Status: DISCONTINUED | OUTPATIENT
Start: 2020-06-05 | End: 2020-06-05 | Stop reason: HOSPADM

## 2020-06-05 RX ORDER — SODIUM CHLORIDE 9 MG/ML
INJECTION, SOLUTION INTRAVENOUS CONTINUOUS
Status: DISCONTINUED | OUTPATIENT
Start: 2020-06-05 | End: 2020-06-07

## 2020-06-05 RX ORDER — ATORVASTATIN CALCIUM 40 MG/1
40 TABLET, FILM COATED ORAL NIGHTLY
Status: DISCONTINUED | OUTPATIENT
Start: 2020-06-05 | End: 2020-06-08 | Stop reason: HOSPADM

## 2020-06-05 RX ORDER — ACETAMINOPHEN 325 MG/1
650 TABLET ORAL EVERY 6 HOURS PRN
Status: DISCONTINUED | OUTPATIENT
Start: 2020-06-05 | End: 2020-06-08 | Stop reason: HOSPADM

## 2020-06-05 RX ORDER — DEXAMETHASONE SODIUM PHOSPHATE 4 MG/ML
4 INJECTION, SOLUTION INTRA-ARTICULAR; INTRALESIONAL; INTRAMUSCULAR; INTRAVENOUS; SOFT TISSUE EVERY 6 HOURS
Qty: 1 ML | Refills: 0 | Status: ON HOLD | OUTPATIENT
Start: 2020-06-05 | End: 2020-06-08 | Stop reason: HOSPADM

## 2020-06-05 RX ORDER — MORPHINE SULFATE 2 MG/ML
2 INJECTION, SOLUTION INTRAMUSCULAR; INTRAVENOUS EVERY 4 HOURS PRN
Status: DISCONTINUED | OUTPATIENT
Start: 2020-06-05 | End: 2020-06-05 | Stop reason: HOSPADM

## 2020-06-05 RX ORDER — KETOROLAC TROMETHAMINE 15 MG/ML
15 INJECTION, SOLUTION INTRAMUSCULAR; INTRAVENOUS ONCE
Status: COMPLETED | OUTPATIENT
Start: 2020-06-05 | End: 2020-06-05

## 2020-06-05 RX ORDER — DEXAMETHASONE SODIUM PHOSPHATE 4 MG/ML
4 INJECTION, SOLUTION INTRA-ARTICULAR; INTRALESIONAL; INTRAMUSCULAR; INTRAVENOUS; SOFT TISSUE ONCE
Status: COMPLETED | OUTPATIENT
Start: 2020-06-05 | End: 2020-06-05

## 2020-06-05 RX ORDER — DEXAMETHASONE SODIUM PHOSPHATE 4 MG/ML
4 INJECTION, SOLUTION INTRA-ARTICULAR; INTRALESIONAL; INTRAMUSCULAR; INTRAVENOUS; SOFT TISSUE EVERY 6 HOURS
Status: DISCONTINUED | OUTPATIENT
Start: 2020-06-05 | End: 2020-06-07

## 2020-06-05 RX ORDER — MORPHINE SULFATE 2 MG/ML
1 INJECTION, SOLUTION INTRAMUSCULAR; INTRAVENOUS EVERY 4 HOURS PRN
Status: CANCELLED | OUTPATIENT
Start: 2020-06-05

## 2020-06-05 RX ORDER — HYDROCODONE BITARTRATE AND ACETAMINOPHEN 5; 325 MG/1; MG/1
1 TABLET ORAL EVERY 6 HOURS PRN
Qty: 1 TABLET | Refills: 0 | Status: ON HOLD
Start: 2020-06-05 | End: 2020-06-05

## 2020-06-05 RX ORDER — SODIUM CHLORIDE 0.9 % (FLUSH) 0.9 %
10 SYRINGE (ML) INJECTION PRN
Status: CANCELLED | OUTPATIENT
Start: 2020-06-05

## 2020-06-05 RX ORDER — ACETAMINOPHEN 650 MG/1
650 SUPPOSITORY RECTAL EVERY 6 HOURS PRN
Status: DISCONTINUED | OUTPATIENT
Start: 2020-06-05 | End: 2020-06-08 | Stop reason: HOSPADM

## 2020-06-05 RX ORDER — LATANOPROST 50 UG/ML
1 SOLUTION/ DROPS OPHTHALMIC NIGHTLY
Status: DISCONTINUED | OUTPATIENT
Start: 2020-06-05 | End: 2020-06-08 | Stop reason: HOSPADM

## 2020-06-05 RX ORDER — ACETAMINOPHEN 650 MG/1
650 SUPPOSITORY RECTAL EVERY 6 HOURS PRN
Status: CANCELLED | OUTPATIENT
Start: 2020-06-05

## 2020-06-05 RX ORDER — PROMETHAZINE HYDROCHLORIDE 25 MG/1
12.5 TABLET ORAL EVERY 6 HOURS PRN
Status: DISCONTINUED | OUTPATIENT
Start: 2020-06-05 | End: 2020-06-08 | Stop reason: HOSPADM

## 2020-06-05 RX ORDER — ACETAMINOPHEN 325 MG/1
650 TABLET ORAL EVERY 6 HOURS PRN
Status: CANCELLED | OUTPATIENT
Start: 2020-06-05

## 2020-06-05 RX ORDER — CLINDAMYCIN PHOSPHATE 600 MG/50ML
600 INJECTION INTRAVENOUS EVERY 8 HOURS
Qty: 1 ML | Refills: 0 | Status: ON HOLD
Start: 2020-06-05 | End: 2020-06-05

## 2020-06-05 RX ORDER — SODIUM CHLORIDE 0.9 % (FLUSH) 0.9 %
10 SYRINGE (ML) INJECTION EVERY 12 HOURS SCHEDULED
Status: DISCONTINUED | OUTPATIENT
Start: 2020-06-05 | End: 2020-06-08 | Stop reason: HOSPADM

## 2020-06-05 RX ORDER — ASPIRIN 81 MG/1
81 TABLET ORAL DAILY
Status: DISCONTINUED | OUTPATIENT
Start: 2020-06-05 | End: 2020-06-08 | Stop reason: HOSPADM

## 2020-06-05 RX ORDER — SPIRONOLACTONE 25 MG/1
25 TABLET ORAL 2 TIMES DAILY
Status: DISCONTINUED | OUTPATIENT
Start: 2020-06-05 | End: 2020-06-08 | Stop reason: HOSPADM

## 2020-06-05 RX ORDER — SODIUM CHLORIDE 9 MG/ML
INJECTION, SOLUTION INTRAVENOUS CONTINUOUS
Status: CANCELLED | OUTPATIENT
Start: 2020-06-05

## 2020-06-05 RX ORDER — ONDANSETRON 2 MG/ML
4 INJECTION INTRAMUSCULAR; INTRAVENOUS EVERY 6 HOURS PRN
Status: CANCELLED | OUTPATIENT
Start: 2020-06-05

## 2020-06-05 RX ORDER — SODIUM CHLORIDE 0.9 % (FLUSH) 0.9 %
10 SYRINGE (ML) INJECTION EVERY 12 HOURS SCHEDULED
Status: CANCELLED | OUTPATIENT
Start: 2020-06-05

## 2020-06-05 RX ORDER — LABETALOL HYDROCHLORIDE 5 MG/ML
10 INJECTION, SOLUTION INTRAVENOUS EVERY 4 HOURS PRN
Status: CANCELLED | OUTPATIENT
Start: 2020-06-05

## 2020-06-05 RX ORDER — MORPHINE SULFATE 2 MG/ML
1 INJECTION, SOLUTION INTRAMUSCULAR; INTRAVENOUS EVERY 4 HOURS PRN
Status: DISCONTINUED | OUTPATIENT
Start: 2020-06-05 | End: 2020-06-08 | Stop reason: HOSPADM

## 2020-06-05 RX ORDER — LABETALOL HYDROCHLORIDE 5 MG/ML
10 INJECTION, SOLUTION INTRAVENOUS EVERY 4 HOURS PRN
Status: DISCONTINUED | OUTPATIENT
Start: 2020-06-05 | End: 2020-06-08 | Stop reason: HOSPADM

## 2020-06-05 RX ORDER — DEXAMETHASONE SODIUM PHOSPHATE 4 MG/ML
4 INJECTION, SOLUTION INTRA-ARTICULAR; INTRALESIONAL; INTRAMUSCULAR; INTRAVENOUS; SOFT TISSUE EVERY 6 HOURS
Status: DISCONTINUED | OUTPATIENT
Start: 2020-06-05 | End: 2020-06-05

## 2020-06-05 RX ORDER — VALSARTAN 80 MG/1
80 TABLET ORAL DAILY
Status: DISCONTINUED | OUTPATIENT
Start: 2020-06-05 | End: 2020-06-08 | Stop reason: HOSPADM

## 2020-06-05 RX ADMIN — MORPHINE SULFATE 4 MG: 4 INJECTION, SOLUTION INTRAMUSCULAR; INTRAVENOUS at 02:26

## 2020-06-05 RX ADMIN — HYDROCODONE BITARTRATE AND ACETAMINOPHEN 1 TABLET: 5; 325 TABLET ORAL at 04:36

## 2020-06-05 RX ADMIN — SODIUM CHLORIDE: 9 INJECTION, SOLUTION INTRAVENOUS at 16:20

## 2020-06-05 RX ADMIN — DEXAMETHASONE SODIUM PHOSPHATE 4 MG: 4 INJECTION, SOLUTION INTRAMUSCULAR; INTRAVENOUS at 09:23

## 2020-06-05 RX ADMIN — LATANOPROST 1 DROP: 50 SOLUTION OPHTHALMIC at 20:43

## 2020-06-05 RX ADMIN — MORPHINE SULFATE 1 MG: 2 INJECTION, SOLUTION INTRAMUSCULAR; INTRAVENOUS at 23:21

## 2020-06-05 RX ADMIN — MORPHINE SULFATE 1 MG: 2 INJECTION, SOLUTION INTRAMUSCULAR; INTRAVENOUS at 16:17

## 2020-06-05 RX ADMIN — Medication 10 ML: at 20:43

## 2020-06-05 RX ADMIN — KETOROLAC TROMETHAMINE 15 MG: 15 INJECTION, SOLUTION INTRAMUSCULAR; INTRAVENOUS at 09:34

## 2020-06-05 RX ADMIN — METRONIDAZOLE 500 MG: 500 INJECTION, SOLUTION INTRAVENOUS at 16:20

## 2020-06-05 RX ADMIN — ATORVASTATIN CALCIUM 40 MG: 40 TABLET, FILM COATED ORAL at 20:43

## 2020-06-05 RX ADMIN — DEXAMETHASONE SODIUM PHOSPHATE 4 MG: 4 INJECTION, SOLUTION INTRAMUSCULAR; INTRAVENOUS at 23:21

## 2020-06-05 RX ADMIN — METRONIDAZOLE 500 MG: 500 INJECTION, SOLUTION INTRAVENOUS at 23:21

## 2020-06-05 RX ADMIN — SPIRONOLACTONE 25 MG: 25 TABLET, FILM COATED ORAL at 20:43

## 2020-06-05 RX ADMIN — DEXAMETHASONE SODIUM PHOSPHATE 4 MG: 4 INJECTION, SOLUTION INTRAMUSCULAR; INTRAVENOUS at 18:30

## 2020-06-05 RX ADMIN — CLINDAMYCIN PHOSPHATE 600 MG: 600 INJECTION, SOLUTION INTRAVENOUS at 05:34

## 2020-06-05 RX ADMIN — ONDANSETRON 4 MG: 2 INJECTION INTRAMUSCULAR; INTRAVENOUS at 16:16

## 2020-06-05 RX ADMIN — VANCOMYCIN HYDROCHLORIDE 1250 MG: 10 INJECTION, POWDER, LYOPHILIZED, FOR SOLUTION INTRAVENOUS at 18:31

## 2020-06-05 ASSESSMENT — PAIN DESCRIPTION - ORIENTATION
ORIENTATION: RIGHT

## 2020-06-05 ASSESSMENT — PAIN DESCRIPTION - FREQUENCY
FREQUENCY: CONTINUOUS

## 2020-06-05 ASSESSMENT — PAIN DESCRIPTION - LOCATION
LOCATION: JAW

## 2020-06-05 ASSESSMENT — PAIN DESCRIPTION - PROGRESSION
CLINICAL_PROGRESSION: NOT CHANGED

## 2020-06-05 ASSESSMENT — PAIN SCALES - GENERAL
PAINLEVEL_OUTOF10: 7
PAINLEVEL_OUTOF10: 8
PAINLEVEL_OUTOF10: 7
PAINLEVEL_OUTOF10: 6
PAINLEVEL_OUTOF10: 8
PAINLEVEL_OUTOF10: 10
PAINLEVEL_OUTOF10: 7
PAINLEVEL_OUTOF10: 6

## 2020-06-05 ASSESSMENT — PAIN DESCRIPTION - DESCRIPTORS
DESCRIPTORS: SHARP
DESCRIPTORS: CRAMPING;DISCOMFORT
DESCRIPTORS: SHARP
DESCRIPTORS: ACHING
DESCRIPTORS: DISCOMFORT

## 2020-06-05 ASSESSMENT — PAIN - FUNCTIONAL ASSESSMENT
PAIN_FUNCTIONAL_ASSESSMENT: ACTIVITIES ARE NOT PREVENTED
PAIN_FUNCTIONAL_ASSESSMENT: PREVENTS OR INTERFERES SOME ACTIVE ACTIVITIES AND ADLS
PAIN_FUNCTIONAL_ASSESSMENT: PREVENTS OR INTERFERES SOME ACTIVE ACTIVITIES AND ADLS

## 2020-06-05 ASSESSMENT — PAIN DESCRIPTION - ONSET
ONSET: ON-GOING

## 2020-06-05 ASSESSMENT — PAIN DESCRIPTION - PAIN TYPE
TYPE: ACUTE PAIN

## 2020-06-05 NOTE — CONSULTS
SHELIA HERRERA M.D. Consult Note      CHIEF COMPLAINT:  Trismus and throat pain    HISTORY OF PRESENT ILLNESS:      The patient is a 79 y.o. female who presents with right lower jaw swelling and trismus. Symptoms have been progressive over 2 to 3-week period. Seen by several dentists who did not note a dental infection. She was however started on azithromycin orally but did not improve. Admitted [facility] [place] [facility] [place] with increasing right jaw pain and trismus. CT imaging suggested soft tissue swelling near the angle of the mandible. She was treated with clindamycin and steroids but without resolution. She was transferred to [facility]. today for further treatment.       Current Medications:   Current Facility-Administered Medications: acetaminophen (TYLENOL) tablet 650 mg, 650 mg, Oral, Q6H PRN **OR** acetaminophen (TYLENOL) suppository 650 mg, 650 mg, Rectal, Q6H PRN  enoxaparin (LOVENOX) injection 40 mg, 40 mg, Subcutaneous, Daily  magnesium hydroxide (MILK OF MAGNESIA) 400 MG/5ML suspension 30 mL, 30 mL, Oral, Daily PRN  promethazine (PHENERGAN) tablet 12.5 mg, 12.5 mg, Oral, Q6H PRN **OR** ondansetron (ZOFRAN) injection 4 mg, 4 mg, Intravenous, Q6H PRN  sodium chloride flush 0.9 % injection 10 mL, 10 mL, Intravenous, 2 times per day  sodium chloride flush 0.9 % injection 10 mL, 10 mL, Intravenous, PRN  0.9 % sodium chloride infusion, , Intravenous, Continuous  labetalol (NORMODYNE;TRANDATE) injection 10 mg, 10 mg, Intravenous, Q4H PRN  morphine (PF) injection 1 mg, 1 mg, Intravenous, Q4H PRN  metronidazole (FLAGYL) 500 mg in NaCl 100 mL IVPB premix, 500 mg, Intravenous, Q8H  vancomycin (VANCOCIN) 1,250 mg in dextrose 5 % 250 mL IVPB, 1,250 mg, Intravenous, Q12H  aspirin EC tablet 81 mg, 81 mg, Oral, Daily  atorvastatin (LIPITOR) tablet 40 mg, 40 mg, Oral, Nightly  dexamethasone (DECADRON) injection 4 mg, 4 mg, Intravenous, Q6H  latanoprost (XALATAN) 0.005 % ophthalmic solution 1 drop, 1 drop, Right Eye, Nightly  levothyroxine (SYNTHROID) tablet 88 mcg, 88 mcg, Oral, Daily  spironolactone (ALDACTONE) tablet 25 mg, 25 mg, Oral, BID  valsartan (DIOVAN) tablet 80 mg, 80 mg, Oral, Daily  Allergies:  Flu virus vaccine; Penicillins;  Tetanus toxoid, adsorbed; Epinephrine; and Lisinopril    Social History     Tobacco Use   Smoking Status Never Smoker   Smokeless Tobacco Never Used     Social History     Substance and Sexual Activity   Alcohol Use Yes    Comment: occ       Current Facility-Administered Medications:     acetaminophen (TYLENOL) tablet 650 mg, 650 mg, Oral, Q6H PRN **OR** acetaminophen (TYLENOL) suppository 650 mg, 650 mg, Rectal, Q6H PRN, Nydia Castellanos MD    enoxaparin (LOVENOX) injection 40 mg, 40 mg, Subcutaneous, Daily, Nydia Castellanos MD    magnesium hydroxide (MILK OF MAGNESIA) 400 MG/5ML suspension 30 mL, 30 mL, Oral, Daily PRN, Nydia Castellanos MD    promethazine (PHENERGAN) tablet 12.5 mg, 12.5 mg, Oral, Q6H PRN **OR** ondansetron (ZOFRAN) injection 4 mg, 4 mg, Intravenous, Q6H PRN, Nydia Castellanos MD, 4 mg at 06/05/20 1616    sodium chloride flush 0.9 % injection 10 mL, 10 mL, Intravenous, 2 times per day, Nydia Castellanos MD    sodium chloride flush 0.9 % injection 10 mL, 10 mL, Intravenous, PRN, Nydia Castellanos MD    0.9 % sodium chloride infusion, , Intravenous, Continuous, Nydia Castellanos MD, Last Rate: 50 mL/hr at 06/05/20 1620    labetalol (NORMODYNE;TRANDATE) injection 10 mg, 10 mg, Intravenous, Q4H PRN, Nydia Castellanos MD    morphine (PF) injection 1 mg, 1 mg, Intravenous, Q4H PRN, Nydia Castellanos MD, 1 mg at 06/05/20 1617    metronidazole (FLAGYL) 500 mg in NaCl 100 mL IVPB premix, 500 mg, Intravenous, Q8H, Dulce Caldera MD, Last Rate: 100 mL/hr at 06/05/20 1620, 500 mg at 06/05/20 1620    vancomycin (VANCOCIN) 1,250 mg in dextrose 5 % 250 mL IVPB, 1,250 mg, Intravenous, Q12H, Nydia Castellanos MD    aspirin EC tablet 81 mg, 81 mg, Oral, Daily, Nydia Castellanos MD    atorvastatin (LIPITOR) tablet 40 mg, 40 mg, Oral, Nightly, Dulce Caldera MD    dexamethasone (DECADRON) injection 4 mg, 4 mg, Intravenous, Q6H, Dulce Caldera MD    latanoprost (XALATAN) 0.005 % ophthalmic solution 1 drop, 1 drop, Right Eye, Nightly, Ritika Lucas MD    levothyroxine (SYNTHROID) tablet 88 mcg, 88 mcg, Oral, Daily, Ritika Lucas MD    spironolactone (ALDACTONE) tablet 25 mg, 25 mg, Oral, BID, Ritika Lucas MD    valsartan (DIOVAN) tablet 80 mg, 80 mg, Oral, Daily, Ritika Lucas MD  Past Medical History:   Diagnosis Date    Hyperlipidemia     Hypertension     Hypothyroid      No past surgical history on file. FAMILY HISTORY:  Reviewed. No significant family history. REVIEW OF SYSTEMS: All pertinent positive and negative findings have been reviewed in HPI. Otherwise, all all systems are reviewed and are negative. PHYSICAL EXAM:    VITALS:  /66   Pulse 61   Temp 97.6 °F (36.4 °C) (Oral)   Resp 18   Ht 5' 2\" (1.575 m)   Wt 162 lb (73.5 kg)   SpO2 96%   BMI 29.63 kg/m²   WELL DEVELOPED WELL NOURISHED. NO PERIPHERAL EDEMA. NO ACUTE RESPIRATORY DISTRESS. ORIENTED X 3.  VOICE: Normal  HEARING BY CONFRONTATION IS NORMAL. HEAD AND FACE: Normal  EXTERNAL EARS AND NOSE : Normal  EXTRAOCULAR MUSCLES:  Intact  FACIAL MUSCLES: Normal strength  FACE PALPATION: Normal  OTOSCOPY: Normal tympanic membranes and middle ears  INTRANASAL: Septum midline. meati clear. Turbinates normal.  LIPS, TEETH, GINGIVA: Normal  BUCCAL MUCOSA: Normal  PHARYNX: Normal.  Patient has trismus and is difficult to evaluate the posterior pharynx. Palpation of the posterior pharynx reveals no induration of the tongue base, tonsil fossa, soft tissues of the floor the mouth on the right side. NECK: No masses or adenopathy. Specifically the right side of the neck has no tenderness or induration. SALIVARY GLANDS: Normal  THYROID: Normal  NASOPHARYNX, HYPOPHARYNX, LARYNX: No indication for examination based on symptoms.   CT

## 2020-06-05 NOTE — DISCHARGE SUMMARY
Hospitalist Discharge Summary    Patient ID:  Dong Hatch  [id number]  6 y.o.  1940    Admit date: 6/3/2020    Discharge date: 6/5/2020    Disposition: [facility]    Admission Diagnoses:   Patient Active Problem List   Diagnosis    Oral abscess    Phlegmon    Trismus    Hyperlipidemia    Hypertension    Hypothyroid       Discharge Diagnoses: Active Problems:    Oral abscess    Phlegmon    Trismus    Hyperlipidemia    Hypertension    Hypothyroid  Resolved Problems:    * No resolved hospital problems. *      Code Status:  Full Code    Condition:  Stable    Discharge Diet: Diet:  DIET GENERAL;    Hospital Course: 36-year-old female presented to the hospital with right-sided jaw pain along with some neck pain. CT of the soft tissue neck showed  phlegmon along the right mandibular angle and ramus. During her hospital stay patient was started on IV clindamycin along with IV Decadron. ENT was also consulted who did not recommend any acute surgical intervention and want to see how she responded with antibiotics. Initially patient started to improve however today she had worsening neck pain along with difficulty swallowing her saliva. Due to concern for worsening of her oral abscess/phlegmon ENT was contacted who assist in transfer to [facility] as they were not available here for possible surgical intervention. Patient will be transferred to [facility] for further care and ENT evaluation. Discharge Medications:   Current Discharge Medication List      START taking these medications    Details   HYDROcodone-acetaminophen (NORCO) 5-325 MG per tablet Take 1 tablet by mouth every 6 hours as needed for Pain for up to 3 days.   Qty: 1 tablet, Refills: 0    Comments: Reduce doses taken as pain becomes manageable  Associated Diagnoses: Oral abscess      dexamethasone (DECADRON) 4 MG/ML injection Infuse 1 mL intravenously every 6 hours  Qty: 1 mL, Refills: 0

## 2020-06-05 NOTE — H&P
Hospital Medicine History & Physical      PCP: Naren Dupont    Date of Admission: 6/5/2020    Chief Complaint: Right facial and jaw swelling      History Of Present Illness:  Patient is a 62-year-old female with past medical history of hypertension, hyperlipidemia, hypothyroidism who presented to [facility] as a transfer from [facility] [place] for worsening right lower jaw swelling. CT of the soft tissue showed phlegmon along the right mandibular angle and ramus, patient was a started on IV clindamycin, IV Decadron, ENT was consulted at her stay at ENT which requested that patient to be transferred to [email or web address], patient mentions it has been difficult for her to open mouth. Patient denies chest pain shortness of breath nausea vomiting diarrhea constipation dysuria. Past Medical History:          Diagnosis Date    Hyperlipidemia     Hypertension     Hypothyroid        Past Surgical History:      No past surgical history on file. Medications Prior to Admission:      Prior to Admission medications    Medication Sig Start Date End Date Taking? Authorizing Provider   spironolactone (ALDACTONE) 25 MG tablet Take 25 mg by mouth 2 times daily Indications: not sure of dose   Yes Historical Provider, MD   atorvastatin (LIPITOR) 40 MG tablet Take 40 mg by mouth nightly    Yes Historical Provider, MD   levothyroxine (SYNTHROID) 88 MCG tablet Take 88 mcg by mouth Daily   Yes Historical Provider, MD   valsartan (DIOVAN) 80 MG tablet Take 80 mg by mouth daily   Yes Historical Provider, MD   aspirin 81 MG EC tablet Take 81 mg by mouth daily   Yes Historical Provider, MD   latanoprost (XALATAN) 0.005 % ophthalmic solution Place 1 drop into the right eye nightly   Yes Historical Provider, MD   HYDROcodone-acetaminophen (NORCO) 5-325 MG per tablet Take 1 tablet by mouth every 6 hours as needed for Pain for up to 3 days.  6/5/20 6/8/20  Ashely Leyva MD   dexamethasone (DECADRON) 4 MG/ML injection 14 17   CREATININE 0.6 0.7   CALCIUM 10.1 9.2     Recent Labs     06/03/20  1426   AST 15   ALT 13   BILITOT 1.4*   ALKPHOS 81     No results for input(s): INR in the last 72 hours. No results for input(s): Maryjo Butt in the last 72 hours. Urinalysis:    No results found for: Marii Escobedo, BACTERIA, [address] [facility], [place], [address], [place] [address]    Radiology:       No orders to display           Active Hospital Problems    Diagnosis Date Noted    Oral abscess [K12.2] 06/03/2020         Patient is a 78-year-old female with past medical history of hypertension, hyperlipidemia, hypothyroidism who presented to [facility] as a transfer from [facility] for worsening right lower jaw swelling. CT of the soft tissue showed phlegmon along the right mandibular angle and ramus, patient was a started on IV clindamycin, IV Decadron, ENT was consulted at her stay at ENT which requested that patient to be transferred to [facility] patient mentions it has been difficult for her to open mouth. Patient denies chest pain shortness of breath nausea vomiting diarrhea constipation dysuria. Assessment  Phlegmon along the right mandibular angle and ramus  Hyperlipidemia  Hypertension  Hypothyroidism    Plan  We will start IV vancomycin, IV Flagyl, consult ENT  Continue Decadron 40 mg  Continue statin  Continue aspirin  Continue levothyroxine  Continue spironolactone, valsartan  Repeat imaging per ENT, awaiting recommendations  DVT Prophylaxis: IV heparin  Diet: Diet NPO Effective Now  Code Status: Full Code    PT/OT Eval Status: Ordered    Dispo -continue inpatient, pending ENT recommendations       Amy Bass MD    Thank you Kelly Means for the opportunity to be involved in this patient's care. If you have any questions or concerns please feel free to contact me at [phone].     Amy Bass MD

## 2020-06-05 NOTE — PLAN OF CARE
Problem: Pain:  Goal: Pain level will decrease  Description: Pain level will decrease  6/5/2020 0416 by Sabrina Luna RN  Outcome: Ongoing    Goal: Control of acute pain  Description: Control of acute pain  6/5/2020 0416 by Sabrina Luna RN  Outcome: Ongoing    Goal: Control of chronic pain  Description: Control of chronic pain  6/5/2020 0416 by Sabrina Luna RN  Outcome: Ongoing    Goal: Patient's pain/discomfort is manageable  Description: Patient's pain/discomfort is manageable  6/5/2020 0416 by Sabrina Luna RN  Outcome: Ongoing

## 2020-06-05 NOTE — PROGRESS NOTES
ENT notified , Caitlyn Mccarthy MD is on call. E[place] stated can only see patient at [facility] inpatient. This RN notified MD Lopez.   MD Lopez is placing for transfer orders to [facility].

## 2020-06-05 NOTE — PLAN OF CARE
Problem: Pain:  Goal: Pain level will decrease  Description: Pain level will decrease  Outcome: Ongoing     Problem: Airway Clearance - Ineffective  Goal: Achieve or maintain patent airway  Outcome: Ongoing     Problem: Gas Exchange - Impaired  Goal: Absence of hypoxia  Outcome: Ongoing

## 2020-06-05 NOTE — CARE COORDINATION
INITIAL CASE MANAGEMENT ASSESSMENT    Reviewed chart, met with patient to assess possible discharge needs. Explained Case Management role/services. Living Situation: Patient lives alone in a condo with 12 steps with railing to enter. ADLs: Independent     DME: none    PT/OT Recs: not ordered     Active Services: none     Transportation: Active      Medications: [employer]/no barriers    PCP: Levar Corona      HD/PD: N/A    PLAN/COMMENTS: Patient plans to return to home. SW/CM provided contact information for patient or family to call with any questions. SW/CM will follow and assist as needed. Electronically signed by Ranjana Castaneda RN on 6/5/2020 at 11:49 AM

## 2020-06-06 LAB
ANION GAP SERPL CALCULATED.3IONS-SCNC: 13 MMOL/L (ref 3–16)
BUN BLDV-MCNC: 15 MG/DL (ref 7–20)
CALCIUM SERPL-MCNC: 9.2 MG/DL (ref 8.3–10.6)
CHLORIDE BLD-SCNC: 102 MMOL/L (ref 99–110)
CO2: 25 MMOL/L (ref 21–32)
CREAT SERPL-MCNC: 0.6 MG/DL (ref 0.6–1.2)
GFR AFRICAN AMERICAN: >60
GFR NON-AFRICAN AMERICAN: >60
GLUCOSE BLD-MCNC: 186 MG/DL (ref 70–99)
HCT VFR BLD CALC: 34.4 % (ref 36–48)
HEMOGLOBIN: 11.7 G/DL (ref 12–16)
LACTIC ACID: 0.9 MMOL/L (ref 0.4–2)
MCH RBC QN AUTO: 31.2 PG (ref 26–34)
MCHC RBC AUTO-ENTMCNC: 34.2 G/DL (ref 31–36)
MCV RBC AUTO: 91.4 FL (ref 80–100)
PDW BLD-RTO: 13.2 % (ref 12.4–15.4)
PLATELET # BLD: 313 K/UL (ref 135–450)
PMV BLD AUTO: 7.4 FL (ref 5–10.5)
POTASSIUM REFLEX MAGNESIUM: 3.9 MMOL/L (ref 3.5–5.1)
RBC # BLD: 3.76 M/UL (ref 4–5.2)
SODIUM BLD-SCNC: 140 MMOL/L (ref 136–145)
WBC # BLD: 8.2 K/UL (ref 4–11)

## 2020-06-06 PROCEDURE — 80048 BASIC METABOLIC PNL TOTAL CA: CPT

## 2020-06-06 PROCEDURE — 2500000003 HC RX 250 WO HCPCS: Performed by: INTERNAL MEDICINE

## 2020-06-06 PROCEDURE — 2580000003 HC RX 258: Performed by: INTERNAL MEDICINE

## 2020-06-06 PROCEDURE — 2060000000 HC ICU INTERMEDIATE R&B

## 2020-06-06 PROCEDURE — 92610 EVALUATE SWALLOWING FUNCTION: CPT

## 2020-06-06 PROCEDURE — 85027 COMPLETE CBC AUTOMATED: CPT

## 2020-06-06 PROCEDURE — 6360000002 HC RX W HCPCS: Performed by: INTERNAL MEDICINE

## 2020-06-06 PROCEDURE — 6370000000 HC RX 637 (ALT 250 FOR IP): Performed by: INTERNAL MEDICINE

## 2020-06-06 PROCEDURE — 83605 ASSAY OF LACTIC ACID: CPT

## 2020-06-06 PROCEDURE — 36415 COLL VENOUS BLD VENIPUNCTURE: CPT

## 2020-06-06 PROCEDURE — 99232 SBSQ HOSP IP/OBS MODERATE 35: CPT | Performed by: OTOLARYNGOLOGY

## 2020-06-06 RX ADMIN — ONDANSETRON 4 MG: 2 INJECTION INTRAMUSCULAR; INTRAVENOUS at 15:25

## 2020-06-06 RX ADMIN — LEVOTHYROXINE SODIUM 88 MCG: 88 TABLET ORAL at 06:39

## 2020-06-06 RX ADMIN — DEXAMETHASONE SODIUM PHOSPHATE 4 MG: 4 INJECTION, SOLUTION INTRAMUSCULAR; INTRAVENOUS at 04:34

## 2020-06-06 RX ADMIN — VANCOMYCIN HYDROCHLORIDE 1250 MG: 10 INJECTION, POWDER, LYOPHILIZED, FOR SOLUTION INTRAVENOUS at 16:44

## 2020-06-06 RX ADMIN — SPIRONOLACTONE 25 MG: 25 TABLET, FILM COATED ORAL at 09:48

## 2020-06-06 RX ADMIN — LATANOPROST 1 DROP: 50 SOLUTION OPHTHALMIC at 22:54

## 2020-06-06 RX ADMIN — ASPIRIN 81 MG: 81 TABLET, COATED ORAL at 09:49

## 2020-06-06 RX ADMIN — ENOXAPARIN SODIUM 40 MG: 40 INJECTION SUBCUTANEOUS at 09:49

## 2020-06-06 RX ADMIN — METRONIDAZOLE 500 MG: 500 INJECTION, SOLUTION INTRAVENOUS at 06:39

## 2020-06-06 RX ADMIN — SPIRONOLACTONE 25 MG: 25 TABLET, FILM COATED ORAL at 21:14

## 2020-06-06 RX ADMIN — DEXAMETHASONE SODIUM PHOSPHATE 4 MG: 4 INJECTION, SOLUTION INTRAMUSCULAR; INTRAVENOUS at 22:55

## 2020-06-06 RX ADMIN — DEXAMETHASONE SODIUM PHOSPHATE 4 MG: 4 INJECTION, SOLUTION INTRAMUSCULAR; INTRAVENOUS at 09:49

## 2020-06-06 RX ADMIN — VALSARTAN 80 MG: 80 TABLET, FILM COATED ORAL at 09:48

## 2020-06-06 RX ADMIN — DEXAMETHASONE SODIUM PHOSPHATE 4 MG: 4 INJECTION, SOLUTION INTRAMUSCULAR; INTRAVENOUS at 16:47

## 2020-06-06 RX ADMIN — ATORVASTATIN CALCIUM 40 MG: 40 TABLET, FILM COATED ORAL at 21:14

## 2020-06-06 RX ADMIN — VANCOMYCIN HYDROCHLORIDE 1250 MG: 10 INJECTION, POWDER, LYOPHILIZED, FOR SOLUTION INTRAVENOUS at 03:42

## 2020-06-06 RX ADMIN — METRONIDAZOLE 500 MG: 500 INJECTION, SOLUTION INTRAVENOUS at 22:54

## 2020-06-06 RX ADMIN — METRONIDAZOLE 500 MG: 500 INJECTION, SOLUTION INTRAVENOUS at 15:15

## 2020-06-06 ASSESSMENT — PAIN DESCRIPTION - FREQUENCY
FREQUENCY: CONTINUOUS
FREQUENCY: CONTINUOUS

## 2020-06-06 ASSESSMENT — PAIN DESCRIPTION - PROGRESSION
CLINICAL_PROGRESSION: NOT CHANGED

## 2020-06-06 ASSESSMENT — PAIN DESCRIPTION - ONSET
ONSET: ON-GOING
ONSET: ON-GOING

## 2020-06-06 ASSESSMENT — PAIN DESCRIPTION - LOCATION
LOCATION: JAW
LOCATION: JAW

## 2020-06-06 ASSESSMENT — PAIN DESCRIPTION - ORIENTATION
ORIENTATION: RIGHT
ORIENTATION: RIGHT

## 2020-06-06 ASSESSMENT — PAIN DESCRIPTION - PAIN TYPE
TYPE: ACUTE PAIN
TYPE: ACUTE PAIN

## 2020-06-06 ASSESSMENT — PAIN SCALES - GENERAL
PAINLEVEL_OUTOF10: 0
PAINLEVEL_OUTOF10: 5
PAINLEVEL_OUTOF10: 0
PAINLEVEL_OUTOF10: 0
PAINLEVEL_OUTOF10: 5
PAINLEVEL_OUTOF10: 0

## 2020-06-06 ASSESSMENT — PAIN DESCRIPTION - DESCRIPTORS
DESCRIPTORS: ACHING
DESCRIPTORS: ACHING

## 2020-06-06 NOTE — PROGRESS NOTES
4 Eyes Admission Assessment     I agree as the admission nurse that 2 RN's have performed a thorough Head to Toe Skin Assessment on the patient. ALL assessment sites listed below have been assessed on admission. Areas assessed by both nurses:   [x]   Head, Face, and Ears   [x]   Shoulders, Back, and Chest  [x]   Arms, Elbows, and Hands   [x]   Coccyx, Sacrum, and Ischum  [x]   Legs, Feet, and Heels        Does the Patient have Skin Breakdown?   No         [place] Prevention initiated:  No   Wound Care Orders initiated:  No      [facility] nurse consulted for Pressure Injury (Stage 3,4, Unstageable, DTI, NWPT, and Complex wounds):  No      Nurse 1 eSignature: Electronically signed by Arsh Hatch RN on 6/6/20 at 4:03 AM EDT    **SHARE this note so that the co-signing nurse is able to place an eSignature**    Nurse 2 eSignature: Electronically signed by Paulette Cuellar RN on 6/6/20 at 4:04 AM EDT

## 2020-06-06 NOTE — PLAN OF CARE
Problem: Pain:  Goal: Pain level will decrease  6/6/2020 0239 by Ino Waldron RN  Outcome: Ongoing  Note: Jaw pain treated with prn morphine and cold packs.       Problem: Airway Clearance - Ineffective  Goal: Achieve or maintain patent airway  6/6/2020 0239 by Ino Waldron RN  Outcome: Completed    Problem: Gas Exchange - Impaired  Goal: Promote optimal lung function  Outcome: Completed     Problem: Gas Exchange - Impaired  Goal: Absence of hypoxia  6/6/2020 0239 by Ino Waldron RN  Outcome: Completed     Problem: Breathing Pattern - Ineffective  Goal: Ability to achieve and maintain a regular respiratory rate  Outcome: Completed

## 2020-06-06 NOTE — PLAN OF CARE
Problem: Pain:  Goal: Pain level will decrease  Description: Pain level will decrease  6/6/2020 1543 by Guillermo Mace, RN  Note: Pt instructed to verbalize and report pain upon onset. Pt c/o 5-6/10 pain to right jaw. Pt offered PRN acetaminophen but declined. Given ice packs for pain. Pt is receiving pain medication PRN. Pt reports that pain decreases after medication administration. Will continue to monitor. Problem: Safety:  Goal: Free from accidental physical injury  Description: Free from accidental physical injury  6/6/2020 1543 by Guillermo Mace, RN  Note: Bed locked in lowest position. Pt ambulate with standby assist. 3/4 Bed rails up. Call light within reach. Pt belongings within reach. Bedside table within reach. Instructed to wear non-skid socks. Clutter cleared from walkway in room. Pt instructed to use call light prior to getting up. Will continue to monitor.

## 2020-06-06 NOTE — PLAN OF CARE
Problem: Pain:  Goal: Control of acute pain  Description: Control of acute pain  Outcome: Ongoing  Note: Patient has stated pain of 5 on a 0-10 scale. Patient has been given ice packs and is resting. Will continue to monitor. Problem: Safety:  Goal: Free from accidental physical injury  Description: Free from accidental physical injury  Outcome: Ongoing  Note: Patient was able to walk to bathroom with stand-by assistance. Patient has bed in lowest position with socks and bed alarm on. Will continue to monitor.

## 2020-06-06 NOTE — PROGRESS NOTES
in her neck 8/10 baseline, pain with swallowing 5/10. Pain:  Pain Assessment  Pain Assessment: 0-10  Pain Level: 5  Patient's Stated Pain Goal: No pain  Pain Type: Acute pain  Pain Location: Jaw  Pain Orientation: Right  Pain Descriptors: Aching  Pain Frequency: Continuous  Pain Onset: On-going  Clinical Progression: Not changed  Functional Pain Assessment: Activities are not prevented  Non-Pharmaceutical Pain Intervention(s): Cold applied  Response to Pain Intervention: Asleep with RR greater than 10    Reason for Referral  Italo Sevilla was referred for a bedside swallow evaluation to assess the efficiency of her swallow function, identify signs and symptoms of aspiration and make recommendations regarding safe dietary consistencies, effective compensatory strategies, and safe eating environment. Impression  Dysphagia Diagnosis: Mild to moderate oral stage dysphagia;Mild pharyngeal stage dysphagia  Dysphagia Impression: Patient presents with mild oral dysphagia characterized by prolonged mastication with hard solids and odynophagia (rated 5/10 pain with solids and liquids), resulting in NO s/s aspiration with thin liquid via straw x10, puree x5, or hard solid x4. She reports 8/10 pain in her R neck at baseline and states it does not worsen with swallowing. Mastication is prolonged yet functional with hard solids, complicated by reduced mandibular ROM. Mastication is improved from L-sided bolus placement. Adequate oral clearance is seen with all consistencies. She demonstrates adequate secretion managament. Pt states she was tolerating a regular diet with thin liquids at home and at [address] regular diet with thin liquids, L-sided bolus placement, self-selecting softer items. Also recommend obtain MBS to further assess pharyngeal function and r/o aspiration.   Dysphagia Outcome Severity Scale: Level 6: Within functional limits/Modified independence     Treatment Plan  Requires SLP Intervention: Yes  Duration/Frequency of Treatment: TBD pending [facility]  D/C Recommendations: Home independently    Recommended Diet and Intervention  Diet Solids Recommendation: Regular  Liquid Consistency Recommendation: Thin  Recommended Form of Meds: Whole with water  Recommendations: Modified barium swallow study    Compensatory Swallowing Strategies  Compensatory Swallowing Strategies: Small bites/sips(L-sided bolus placement)    Treatment/Goals  TBD pending [facility]      General  Chart Reviewed: Yes  Comments: 80yoF admitted with right-sided mandibular swelling and trismus. Symptoms have been progressive over 2 to 3-week period. CT imaging suggested soft tissue swelling near the angle of the mandible. PMH: HLD, HTN, hypothyroid. States that she had a recent tooth infection and was started on antibiotics. Reports that today she is having difficulty opening her mouth and \"a little trouble swallowing. \" No SLP History at [facility]). Subjective  Subjective: Pleasant and cooperative. Very hungry and thirsty. Behavior/Cognition: Alert; Cooperative  Respiratory Status: Room air  Breath Sounds: Clear  O2 Device: None (Room air)  Communication Observation: Functional  Follows Directions: Complex  Dentition: Adequate  Patient Positioning: Upright in bed  Baseline Vocal Quality: Normal  Volitional Cough: Strong  Prior Dysphagia History: None per patient. Consistencies Administered: Reg solid; Thin;Dysphagia Pureed (Dysphagia I)      Vision/Hearing  Vision  Vision: Within Functional Limits  Hearing  Hearing: Exceptions to [facility]  Hearing Exceptions: Hard of hearing/hearing concerns    Oral Motor Deficits  Oral/Motor  Oral Motor: Exceptions to WFL(reduces facial strength on R side)  Mandible: Impaired(reduced ROM)    Oral Phase Dysfunction  Oral Phase  Oral Phase: Exceptions  Oral Phase Dysfunction  Impaired Mastication: Reg Solid     Indicators of Pharyngeal Phase Dysfunction   Pharyngeal Phase  Pharyngeal Phase:

## 2020-06-07 LAB
BLOOD CULTURE, ROUTINE: NORMAL
CULTURE, BLOOD 2: NORMAL
VANCOMYCIN TROUGH: 18.5 UG/ML (ref 10–20)

## 2020-06-07 PROCEDURE — 6370000000 HC RX 637 (ALT 250 FOR IP): Performed by: INTERNAL MEDICINE

## 2020-06-07 PROCEDURE — 2580000003 HC RX 258: Performed by: INTERNAL MEDICINE

## 2020-06-07 PROCEDURE — 2500000003 HC RX 250 WO HCPCS: Performed by: INTERNAL MEDICINE

## 2020-06-07 PROCEDURE — 2060000000 HC ICU INTERMEDIATE R&B

## 2020-06-07 PROCEDURE — 97161 PT EVAL LOW COMPLEX 20 MIN: CPT

## 2020-06-07 PROCEDURE — 36415 COLL VENOUS BLD VENIPUNCTURE: CPT

## 2020-06-07 PROCEDURE — 80202 ASSAY OF VANCOMYCIN: CPT

## 2020-06-07 PROCEDURE — 97116 GAIT TRAINING THERAPY: CPT

## 2020-06-07 PROCEDURE — 97530 THERAPEUTIC ACTIVITIES: CPT

## 2020-06-07 PROCEDURE — 6360000002 HC RX W HCPCS: Performed by: INTERNAL MEDICINE

## 2020-06-07 RX ADMIN — VALSARTAN 80 MG: 80 TABLET, FILM COATED ORAL at 10:24

## 2020-06-07 RX ADMIN — Medication 10 ML: at 20:54

## 2020-06-07 RX ADMIN — ACETAMINOPHEN 650 MG: 325 TABLET ORAL at 16:57

## 2020-06-07 RX ADMIN — ASPIRIN 81 MG: 81 TABLET, COATED ORAL at 10:24

## 2020-06-07 RX ADMIN — METRONIDAZOLE 500 MG: 500 INJECTION, SOLUTION INTRAVENOUS at 07:00

## 2020-06-07 RX ADMIN — DEXAMETHASONE SODIUM PHOSPHATE 4 MG: 4 INJECTION, SOLUTION INTRAMUSCULAR; INTRAVENOUS at 10:24

## 2020-06-07 RX ADMIN — Medication 10 ML: at 10:25

## 2020-06-07 RX ADMIN — SPIRONOLACTONE 25 MG: 25 TABLET, FILM COATED ORAL at 10:24

## 2020-06-07 RX ADMIN — VANCOMYCIN HYDROCHLORIDE 1250 MG: 10 INJECTION, POWDER, LYOPHILIZED, FOR SOLUTION INTRAVENOUS at 03:09

## 2020-06-07 RX ADMIN — ENOXAPARIN SODIUM 40 MG: 40 INJECTION SUBCUTANEOUS at 10:24

## 2020-06-07 RX ADMIN — METRONIDAZOLE 500 MG: 500 INJECTION, SOLUTION INTRAVENOUS at 16:24

## 2020-06-07 RX ADMIN — DEXAMETHASONE SODIUM PHOSPHATE 4 MG: 4 INJECTION, SOLUTION INTRAMUSCULAR; INTRAVENOUS at 16:50

## 2020-06-07 RX ADMIN — LATANOPROST 1 DROP: 50 SOLUTION OPHTHALMIC at 20:55

## 2020-06-07 RX ADMIN — VANCOMYCIN HYDROCHLORIDE 1000 MG: 10 INJECTION, POWDER, LYOPHILIZED, FOR SOLUTION INTRAVENOUS at 16:57

## 2020-06-07 RX ADMIN — METRONIDAZOLE 500 MG: 500 INJECTION, SOLUTION INTRAVENOUS at 23:22

## 2020-06-07 RX ADMIN — DEXAMETHASONE SODIUM PHOSPHATE 4 MG: 4 INJECTION, SOLUTION INTRAMUSCULAR; INTRAVENOUS at 04:23

## 2020-06-07 RX ADMIN — ATORVASTATIN CALCIUM 40 MG: 40 TABLET, FILM COATED ORAL at 20:53

## 2020-06-07 RX ADMIN — SPIRONOLACTONE 25 MG: 25 TABLET, FILM COATED ORAL at 20:53

## 2020-06-07 RX ADMIN — LEVOTHYROXINE SODIUM 88 MCG: 88 TABLET ORAL at 06:32

## 2020-06-07 ASSESSMENT — PAIN DESCRIPTION - FREQUENCY: FREQUENCY: CONTINUOUS

## 2020-06-07 ASSESSMENT — PAIN DESCRIPTION - ONSET: ONSET: ON-GOING

## 2020-06-07 ASSESSMENT — PAIN - FUNCTIONAL ASSESSMENT: PAIN_FUNCTIONAL_ASSESSMENT: ACTIVITIES ARE NOT PREVENTED

## 2020-06-07 ASSESSMENT — PAIN SCALES - GENERAL
PAINLEVEL_OUTOF10: 0
PAINLEVEL_OUTOF10: 3
PAINLEVEL_OUTOF10: 0

## 2020-06-07 ASSESSMENT — PAIN DESCRIPTION - PAIN TYPE: TYPE: ACUTE PAIN

## 2020-06-07 ASSESSMENT — PAIN DESCRIPTION - DESCRIPTORS: DESCRIPTORS: ACHING

## 2020-06-07 ASSESSMENT — PAIN DESCRIPTION - ORIENTATION: ORIENTATION: RIGHT

## 2020-06-07 ASSESSMENT — PAIN DESCRIPTION - DIRECTION: RADIATING_TOWARDS: THROAT

## 2020-06-07 ASSESSMENT — PAIN DESCRIPTION - PROGRESSION: CLINICAL_PROGRESSION: NOT CHANGED

## 2020-06-07 NOTE — PROGRESS NOTES
Clinical Pharmacy Progress Note    Admit date: 2020     Interval update: pain improved. Remains afebrile. No renal lab today. Per primary team, IV antibiotics may be changed to Augmentin    Subjective/Objective:  Pt is a 80yof with PMHx that includes HTN, HLD, and hypothyroidism who was initially admitted to OSH with right sided jaw pain - imaging showed rt mandibular phlegmon. Pt was clinically improving with IV Clindamycin, but today had worsening neck pain and difficulty swallowing so is now transferred to [place] [id number] for ENT eval.    Pharmacy is consulted to dose Vancomycin per Dr. Kirill Dickson    Current antibiotics:   (6/3 -  at OSH)  Metronidazole 500mg IV q8h - day #3  Vancomycin - Pharmacy to dose - day #3   1.25g IV q12h ( - current)      Recent Labs     20  0549      K 3.9      CO2 25   BUN 15   CREATININE 0.6   GLUCOSE 186*       Estimated Creatinine Clearance: 71 mL/min (based on SCr of 0.6 mg/dL). Lab Results   Component Value Date    WBC 8.2 2020    HGB 11.7 (L) 2020    HCT 34.4 (L) 2020    MCV 91.4 2020     2020       No results found for: PROTIME, INR    Height:  5' 2\" (157.5 cm)  Weight:  162 lb (73.5 kg)    Culture results:  Blood (6/3) = No growth to date  SARS-CoV-2 () = negative  Blood () = NGTD    Prophylaxis:  VTE:  Enoxaparin  GI:  Not indicated    Vancomycin therapeutic monitorin/7 @0250 =  18.5 mcg/mL     Assessment/Plan:  1)  Rt mandibular phlegmon / abscess:  Metronidazole + Vancomycin - day #3  · Vancomycin - Pharmacy to dose  · Based on estimated kinetics and age / size, will start 1.25g IV q12h. · Trough level this AM = 18.5mcg/mL; therapeutic however, will empirically reduce the dose to 1g IV q12h, based on patient's BMI 30. Goal for mandibular phelgmon abscess ~15mcg/mL  · Renal function stable. · Will continue to monitor renal function and clinical status.      Please call with questions--  Thanks--  Reji Angel,

## 2020-06-07 NOTE — PROGRESS NOTES
Hospitalist Progress Note      PCP: Goldy Oliveira    Date of Admission: 6/5/2020      Subjective:   denies chest pain, nausea, vomiting, shortness of breath, fever or chills. mention feels overall better    Medications:  Reviewed    Infusion Medications     Scheduled Medications    vancomycin  1,000 mg Intravenous Q12H    enoxaparin  40 mg Subcutaneous Daily    sodium chloride flush  10 mL Intravenous 2 times per day    metroNIDAZOLE  500 mg Intravenous Q8H    aspirin  81 mg Oral Daily    atorvastatin  40 mg Oral Nightly    dexamethasone  4 mg Intravenous Q6H    latanoprost  1 drop Right Eye Nightly    levothyroxine  88 mcg Oral Daily    spironolactone  25 mg Oral BID    valsartan  80 mg Oral Daily     PRN Meds: acetaminophen **OR** acetaminophen, magnesium hydroxide, promethazine **OR** ondansetron, sodium chloride flush, labetalol, morphine      Intake/Output Summary (Last 24 hours) at 6/7/2020 1801  Last data filed at 6/7/2020 0916  Gross per 24 hour   Intake 840 ml   Output --   Net 840 ml       Physical Exam Performed:    /80   Pulse 57   Temp 98.2 °F (36.8 °C) (Oral)   Resp 18   Ht 5' 2\" (1.575 m)   Wt 162 lb (73.5 kg)   SpO2 94%   BMI 29.63 kg/m²     General appearance: No apparent distress,   HEENT:  Conjunctivae/corneas clear. Neck: Supple, with full range of motion. Respiratory:  Normal respiratory effort. Clear to auscultation, bilaterally without Rales/Wheezes/Rhonchi. Cardiovascular: Regular rate and rhythm with normal S1/S2 without murmurs or rubs  Abdomen: Soft, non-tender, non-distended, normal bowel sounds. Musculoskeletal: No cyanosis or edema bilaterally  Neurologic:  without any focal sensory/motor deficits.  grossly non-focal.  Psychiatric: Alert and oriented, Normal mood  Peripheral Pulses: +2 palpable, equal bilaterally       Labs:   Recent Labs     06/06/20  0550   WBC 8.2   HGB 11.7*   HCT 34.4*        Recent Labs     06/06/20  0549      K

## 2020-06-07 NOTE — PROGRESS NOTES
Hospitalist Progress Note      PCP: Rishi Porter    Date of Admission: 6/5/2020      Subjective:   denies chest pain, nausea, vomiting, shortness of breath, fever or chills. mention feels overall better    Medications:  Reviewed    Infusion Medications    sodium chloride 50 mL/hr at 06/05/20 1620     Scheduled Medications    enoxaparin  40 mg Subcutaneous Daily    sodium chloride flush  10 mL Intravenous 2 times per day    metroNIDAZOLE  500 mg Intravenous Q8H    vancomycin  1,250 mg Intravenous Q12H    aspirin  81 mg Oral Daily    atorvastatin  40 mg Oral Nightly    dexamethasone  4 mg Intravenous Q6H    latanoprost  1 drop Right Eye Nightly    levothyroxine  88 mcg Oral Daily    spironolactone  25 mg Oral BID    valsartan  80 mg Oral Daily     PRN Meds: acetaminophen **OR** acetaminophen, magnesium hydroxide, promethazine **OR** ondansetron, sodium chloride flush, labetalol, morphine      Intake/Output Summary (Last 24 hours) at 6/6/2020 7133  Last data filed at 6/6/2020 1833  Gross per 24 hour   Intake 920 ml   Output --   Net 920 ml       Physical Exam Performed:    /80   Pulse 54   Temp 98.1 °F (36.7 °C) (Oral)   Resp 16   Ht 5' 2\" (1.575 m)   Wt 162 lb (73.5 kg)   SpO2 97%   BMI 29.63 kg/m²     General appearance: No apparent distress,   HEENT:  Conjunctivae/corneas clear. Neck: Supple, with full range of motion. Respiratory:  Normal respiratory effort. Clear to auscultation, bilaterally without Rales/Wheezes/Rhonchi. Cardiovascular: Regular rate and rhythm with normal S1/S2 without murmurs or rubs  Abdomen: Soft, non-tender, non-distended, normal bowel sounds. Musculoskeletal: No cyanosis or edema bilaterally  Neurologic:  without any focal sensory/motor deficits.  grossly non-focal.  Psychiatric: Alert and oriented, Normal mood  Peripheral Pulses: +2 palpable, equal bilaterally       Labs:   Recent Labs     06/04/20  0532 06/06/20  0550   WBC 9.0 8.2   HGB 13.3 11.7* HCT 38.9 34.4*    313     Recent Labs     06/04/20  0532 06/06/20  0549    140   K 4.6 3.9    102   CO2 24 25   BUN 17 15   CREATININE 0.7 0.6   CALCIUM 9.2 9.2     No results for input(s): AST, ALT, BILIDIR, BILITOT, ALKPHOS in the last 72 hours. No results for input(s): INR in the last 72 hours. No results for input(s): Marcha Jess in the last 72 hours. Urinalysis:    No results found for: Earl BIBI Toro, [address] [facility], [place], [address], [place] [address]    Radiology:  No orders to display         Assessment/Plan:    Active Hospital Problems    Diagnosis    Throat pain [R07.0]    Oral abscess [K12.2]       Patient is a 78-year-old female with past medical history of hypertension, hyperlipidemia, hypothyroidism who presented to [facility] as a [facility] from [facility] [place] for worsening right lower jaw swelling. CT of the soft tissue showed phlegmon along the right mandibular angle and ramus, patient was a started on IV clindamycin, IV Decadron, ENT was consulted at her stay at ENT which requested that patient to be transferred to [facility]., patient mentions it has been difficult for her to open mouth.   Patient denies chest pain shortness of breath nausea vomiting diarrhea constipation dysuria.     Assessment  Phlegmon along the right mandibular angle and ramus  Hyperlipidemia  Hypertension  Hypothyroidism     Plan  We will start IV vancomycin, IV Flagyl, consult ENT - no I&D needed, will continue IV abx, switch to oral Augmentin in AM  Continue Decadron 40 mg  Continue statin  Continue aspirin  Continue levothyroxine  Continue spironolactone, valsartan     PT/OT Eval Status: Ordered     Dispo -continue inpatient, Discharge in AM  DVT Prophylaxis:  Diet: DIET GENERAL;  Code Status: Full Code      Davide Whitmore MD

## 2020-06-08 ENCOUNTER — APPOINTMENT (OUTPATIENT)
Dept: GENERAL RADIOLOGY | Age: 80
DRG: 159 | End: 2020-06-08
Attending: INTERNAL MEDICINE
Payer: MEDICARE

## 2020-06-08 VITALS
RESPIRATION RATE: 16 BRPM | WEIGHT: 162 LBS | HEART RATE: 55 BPM | HEIGHT: 62 IN | SYSTOLIC BLOOD PRESSURE: 151 MMHG | TEMPERATURE: 98 F | DIASTOLIC BLOOD PRESSURE: 72 MMHG | OXYGEN SATURATION: 97 % | BODY MASS INDEX: 29.81 KG/M2

## 2020-06-08 LAB — C DIFF TOXIN/ANTIGEN: NORMAL

## 2020-06-08 PROCEDURE — 92611 MOTION FLUOROSCOPY/SWALLOW: CPT

## 2020-06-08 PROCEDURE — 6360000002 HC RX W HCPCS: Performed by: INTERNAL MEDICINE

## 2020-06-08 PROCEDURE — 6370000000 HC RX 637 (ALT 250 FOR IP): Performed by: INTERNAL MEDICINE

## 2020-06-08 PROCEDURE — 74230 X-RAY XM SWLNG FUNCJ C+: CPT

## 2020-06-08 PROCEDURE — 87324 CLOSTRIDIUM AG IA: CPT

## 2020-06-08 PROCEDURE — 2500000003 HC RX 250 WO HCPCS: Performed by: INTERNAL MEDICINE

## 2020-06-08 PROCEDURE — 87449 NOS EACH ORGANISM AG IA: CPT

## 2020-06-08 PROCEDURE — 2580000003 HC RX 258: Performed by: INTERNAL MEDICINE

## 2020-06-08 RX ORDER — CLINDAMYCIN HYDROCHLORIDE 300 MG/1
300 CAPSULE ORAL 2 TIMES DAILY
Qty: 14 CAPSULE | Refills: 0 | Status: SHIPPED | OUTPATIENT
Start: 2020-06-08 | End: 2020-06-15

## 2020-06-08 RX ADMIN — LEVOTHYROXINE SODIUM 88 MCG: 88 TABLET ORAL at 06:48

## 2020-06-08 RX ADMIN — ASPIRIN 81 MG: 81 TABLET, COATED ORAL at 09:37

## 2020-06-08 RX ADMIN — SPIRONOLACTONE 25 MG: 25 TABLET, FILM COATED ORAL at 09:37

## 2020-06-08 RX ADMIN — VANCOMYCIN HYDROCHLORIDE 1000 MG: 10 INJECTION, POWDER, LYOPHILIZED, FOR SOLUTION INTRAVENOUS at 17:36

## 2020-06-08 RX ADMIN — VANCOMYCIN HYDROCHLORIDE 1000 MG: 10 INJECTION, POWDER, LYOPHILIZED, FOR SOLUTION INTRAVENOUS at 03:21

## 2020-06-08 RX ADMIN — ENOXAPARIN SODIUM 40 MG: 40 INJECTION SUBCUTANEOUS at 09:37

## 2020-06-08 RX ADMIN — Medication 10 ML: at 09:38

## 2020-06-08 RX ADMIN — METRONIDAZOLE 500 MG: 500 INJECTION, SOLUTION INTRAVENOUS at 06:49

## 2020-06-08 RX ADMIN — METRONIDAZOLE 500 MG: 500 INJECTION, SOLUTION INTRAVENOUS at 15:29

## 2020-06-08 RX ADMIN — VALSARTAN 80 MG: 80 TABLET, FILM COATED ORAL at 09:37

## 2020-06-08 ASSESSMENT — PAIN SCALES - GENERAL
PAINLEVEL_OUTOF10: 0
PAINLEVEL_OUTOF10: 0

## 2020-06-08 NOTE — PROGRESS NOTES
Speech Language Pathology  Dysphagia - contact note    Chart reviewed, d/w RN, who states okay to proceed with MBS this date. Full note to follow.

## 2020-06-08 NOTE — PROCEDURES
INSTRUMENTAL SWALLOW REPORT  MODIFIED BARIUM SWALLOW/DC    NAME: Bassam Altman   : 1940  MRN: [id number]       Date of Eval: 2020     Ordering Physician: Dr. Matthew Lopez  Radiologist: Dr. Maria Luisa Magaña     Referring Diagnosis(es): Referring Diagnosis: oral abscess    Past Medical History:  has a past medical history of Hyperlipidemia, Hypertension, and Hypothyroid. Past Surgical History:  has no past surgical history on file. Current Diet Solid Consistency: Regular  Current Diet Liquid Consistency: Thin  Type of Study: Initial MBS      Patient Complaints/Reason for Referral:  Bassam Altman was referred for a MBS to assess the efficiency of his/her swallow function, assess for aspiration, and to make recommendations regarding safe dietary consistencies, effective compensatory strategies, and safe eating environment.      ENT Consult :  IMPRESSION: No indication of an infection that requires incision or drainage.  Truly believe that infection is odontogenic. RECOMMENDATIONS: Patient has been started on Flagyl, vancomycin, and Decadron, will follow in the morning. PHARYNX: Normal.  Patient has trismus and is difficult to evaluate the posterior pharynx.  Palpation of the posterior pharynx reveals no induration of the tongue base, tonsil fossa, soft tissues of the floor the mouth on the right side. NECK: No masses or adenopathy.  Specifically the right side of the neck has no tenderness or induration.     CT Soft Tissue Neck with Contrast:  Impression: Soft tissue phlegmon along the lingual aspect of the right mandibular angle and ramus.  No discrete rim enhancing fluid collection to suggest drainable abscess.   SOFT TISSUES:  Within the inferior medial right  space along the lingual aspect of the right mandibular ramus and angle, there is an ill-defined focus of hypoechoic soft tissue measuring 2.6 cm AP x 2 cm transverse by 1.9 cm craniocaudal that does not have a discrete rim enhancing WFL    Esophageal Phase  Not assessed    Pain   Patient Currently in Pain: No    Therapy Time:   Individual Concurrent Group Co-treatment   Time In 1045         Time Out 1105         Minutes 20            Plan:  Recommended diet: cont regular diet/thin liquids  Dc recommendation: no follow up indicated    Mikaela Thompson M.S./[facility] #[id number]  Pg.  # [phone]    Needs met prior to leaving room, call light within reach, d/w FELIX Parker   6/8/2020, 11:33 AM

## 2020-06-08 NOTE — PLAN OF CARE
Problem: Pain:  Description: Pain management should include both nonpharmacologic and pharmacologic interventions. Goal: Pain level will decrease  Outcome: Met This Shift  Note: Patient denied pain during shift. Educated patient to call if need pain medication. Pt verbalized  understanding. VSS  Will continue to monitor. Problem: Infection:  Goal: Will remain free from infection  Note: Patient afebile during shift. Iv Vanco and Flagyl given. Daily labs Will continue to monitor.

## 2020-06-08 NOTE — PROGRESS NOTES
Occupational Therapy  Discharge    Order received, chart reviewed. Per discussion w/ nurse, pt up ad tracy and steady. No acute OT needs. Will sign off. No evaluation charge.     Haile Sheth OTR/L #[id number]

## 2020-06-08 NOTE — PROGRESS NOTES
Clinical Pharmacy Progress Note    Admit date: 6/5/2020     Interval update: Plan to discharge on PO Augmentin, potentially today. Subjective/Objective:  Pt is a 77yof with PMHx that includes HTN, HLD, and hypothyroidism who was initially admitted to OSH with right sided jaw pain - imaging showed rt mandibular phlegmon. Pt was clinically improving with IV Clindamycin, but today had worsening neck pain and difficulty swallowing so is now transferred to [facility] for ENT eval.    Pharmacy is consulted to dose Vancomycin per Dr. Jemal Scott    Current antibiotics:   (6/3 - 6/5 at OSH)  Metronidazole 500mg IV q8h - day #3  Vancomycin - Pharmacy to dose - day #3    (6/5 - 6/7)   1g IV q12h (6/7-current)      Recent Labs     06/06/20  0549      K 3.9      CO2 25   BUN 15   CREATININE 0.6   GLUCOSE 186*       Estimated Creatinine Clearance: 71 mL/min (based on SCr of 0.6 mg/dL). Lab Results   Component Value Date    WBC 8.2 06/06/2020    HGB 11.7 (L) 06/06/2020    HCT 34.4 (L) 06/06/2020    MCV 91.4 06/06/2020     06/06/2020     Height:  5' 2\" (157.5 cm)  Weight:  162 lb (73.5 kg)    Vancomycin levels:   6/7 Trough @ 02:50 = 18.5 mcg/mL (drawn appropriately on 1.25g IV q12h)    Culture results:  Blood (6/3) = No growth to date  SARS-CoV-2 (6/5) = negative  Blood (6/5) = NGTD    Prophylaxis:  VTE:  Enoxaparin  GI:  Not indicated    Assessment/Plan:  1)  Rt mandibular phlegmon / abscess:  Metronidazole + Vancomycin - day #3   Vancomycin - Pharmacy to dose  · Currently on 1g IV q12h. Dose decreased yesterday out of caution due to trough of 18.5 mcg/mL. · No new Scr since 6/6. As anticipate discharge today, will continue same dose for now. · Will continue to monitor clinical status and adjust dosing as needed.      Please call with questions--  Thanks--  Lionel StricklandD., [facility]   6/8/2020 8:43 AM  Wireless: [phone]

## 2020-06-09 LAB
BLOOD CULTURE, ROUTINE: NORMAL
CULTURE, BLOOD 2: NORMAL

## 2020-06-12 NOTE — DISCHARGE SUMMARY
Hospital Medicine Discharge Summary    Patient ID: Glendy Woodward      Patient's PCP: Roz Diaz    Admit Date: 6/5/2020     Discharge Date: 6/8/2020     Admitting Physician: Rubi Romero MD     Discharge Physician: Rubi Romero MD     Discharge Diagnoses:   Phlegmon along the right mandibular angle and ramus-continues to improve  Hyperlipidemia  Hypertension  Hypothyroidism           Active Hospital Problems    Diagnosis Date Noted    Throat pain [R07.0]     Oral abscess [K12.2] 06/03/2020       The patient was seen and examined on day of discharge and this discharge summary is in conjunction with any daily progress note from day of discharge. Condition at discharge - stable    Hospital Course:   Patient presented to hospital as a transfer from [facility] [place] [facility]"" AT [place] for ENT evaluation for suspected right mandibular swelling. patient seen and evaluated by ENT. Patient cleared for discharge by ENT. Patient will be started on oral amoxicillin clavulanic acid. Patient is stable for discharge. Patient agrees with discharge plan. Patient passed MBS study with speech therapy. All questions of the patient answered. Patient will follow-up with PCP    Exam:     BP (!) 151/72   Pulse 55   Temp 98 °F (36.7 °C) (Oral)   Resp 16   Ht 5' 2\" (1.575 m)   Wt 162 lb (73.5 kg)   SpO2 97%   BMI 29.63 kg/m²     General appearance: No apparent distress  HEENT:  Conjunctivae/corneas clear. Neck: Supple, with full range of motion. No jugular venous distention. Trachea midline. Respiratory:  Normal respiratory effort. Clear to auscultation, bilaterally without Rales/Wheezes/Rhonchi. Cardiovascular: Regular rate and rhythm with normal S1/S2 without murmurs, rubs or gallops. Abdomen: Soft, non-tender, non-distended, normal bowel sounds. Musculoskelatal: No clubbing, cyanosis or edema bilaterally. Full range of motion without deformity.   Skin: Skin color, texture, turgor normal.  No rashes or lesions. Neurologic: no focal neurologic deficits. Cranial nerves: II-XII intact, grossly non-focal.  Psychiatric: Alert and oriented, normal mood    Consults:     IP CONSULT TO OTOLARYNGOLOGY  PHARMACY TO DOSE VANCOMYCIN      Code Status:  Prior    Activity: activity as tolerated    Labs: For convenience and continuity at follow-up the following most recent labs are provided:      CBC:    Lab Results   Component Value Date    WBC 8.2 06/06/2020    HGB 11.7 06/06/2020    HCT 34.4 06/06/2020     06/06/2020       Renal:    Lab Results   Component Value Date     06/06/2020    K 3.9 06/06/2020     06/06/2020    CO2 25 06/06/2020    BUN 15 06/06/2020    CREATININE 0.6 06/06/2020    CALCIUM 9.2 06/06/2020       Discharge Medications:     Discharge Medication List as of 6/8/2020  1:27 PM           Details   clindamycin (CLEOCIN) 300 MG capsule Take 1 capsule by mouth 2 times daily for 7 days, Disp-14 capsule, R-0Normal              Details   spironolactone (ALDACTONE) 25 MG tablet Take 25 mg by mouth 2 times daily Historical Med      atorvastatin (LIPITOR) 40 MG tablet Take 40 mg by mouth nightly Historical Med      levothyroxine (SYNTHROID) 88 MCG tablet Take 88 mcg by mouth DailyHistorical Med      valsartan (DIOVAN) 80 MG tablet Take 80 mg by mouth dailyHistorical Med      aspirin 81 MG EC tablet Take 81 mg by mouth dailyHistorical Med      latanoprost (XALATAN) 0.005 % ophthalmic solution Place 1 drop into the right eye nightlyHistorical Med             Time Spent on discharge is more than 30 mints in the examination, evaluation, counseling and review of medications and discharge plan. Signed:    Kandice Oden MD   6/12/2020      Thank you Lisa Kevin for the opportunity to be involved in this patient's care. If you have any questions or concerns please feel free to contact me at [phone].

## 2020-07-10 NOTE — PROGRESS NOTES
Oral, Daily, Radha Toribio MD    atorvastatin (LIPITOR) tablet 40 mg, 40 mg, Oral, Nightly, Dulce Caldera MD, 40 mg at 06/05/20 2043    dexamethasone (DECADRON) injection 4 mg, 4 mg, Intravenous, Q6H, Dulce Caldera MD, 4 mg at 06/06/20 0434    latanoprost (XALATAN) 0.005 % ophthalmic solution 1 drop, 1 drop, Right Eye, Nightly, Radha Toribio MD, 1 drop at 06/05/20 2043    levothyroxine (SYNTHROID) tablet 88 mcg, 88 mcg, Oral, Daily, Radha Toribio MD, 88 mcg at 06/06/20 3724    spironolactone (ALDACTONE) tablet 25 mg, 25 mg, Oral, BID, Radha Toribio MD, 25 mg at 06/05/20 2043    valsartan (DIOVAN) tablet 80 mg, 80 mg, Oral, Daily, Radha Toribio MD                                                 Past Medical History:   Diagnosis Date    Hyperlipidemia     Hypertension     Hypothyroid                                                   No past surgical history on file. FAMILY HISTORY: Family history reviewed and except as pertinent to the interim history is not contributory. REVIEW OF SYSTEMS:  All pertinent positive and negative findings included in HPI. Otherwise, all other systems are reviewed and are negative    PHYSICAL EXAMINATION:   GENERAL: wdwn- no acute distress  RESPIRATORY: No stridor. COMMUNICATION :  Normal voice  MENTAL STATUS: Alert and oriented x3  HEAD AND FACE: No skin lesions of the face. EXTERNAL EARS AND NOSE: The external ears and nasal pyramid are normal.  FACIAL MUSCLES: All branches of the facial nerve intact. FACE PALPATION: Zygomatic arches and orbital rims are intact. No tenderness over the sinuses. EXTRAOCULAR MUSCLES: Intact with full range of motion. OTOSCOPY:  Normal tympanic membranes, middle ear spaces, and external auditory canals. TUNING FORKS:  Rinne ++ Beltran midline at 512 Hz  INTRANASAL:  Septum midline, turbinates normal, meati clear. LIPS, TEETH, GINGIVA:  Normal mucosa  PHARYNX:  Normal.  Less trismus. No edema of the posterior pharynx.   On palpation there are no intraoral fluctuant masses. NECK:  No masses. LYMPH NODES: No cervical lymphadenopathy. SALIVARY GLANDS: Parotid and submandibular glands normal.  THYROID: No goiter or thyroid nodules palpable. IMPRESSION: Resolving dental infection. PLAN: No indication for ENT intervention. FOLLOW-UP: As needed as an outpatient. no

## 2024-09-05 ENCOUNTER — HOSPITAL ENCOUNTER (EMERGENCY)
Age: 84
Discharge: HOME | End: 2024-09-05
Attending: EMERGENCY MEDICINE
Payer: OTHER MISCELLANEOUS

## 2024-09-05 ENCOUNTER — APPOINTMENT (OUTPATIENT)
Dept: CT IMAGING | Age: 84
End: 2024-09-05
Payer: OTHER MISCELLANEOUS

## 2024-09-05 VITALS
TEMPERATURE: 97.8 F | BODY MASS INDEX: 31.08 KG/M2 | OXYGEN SATURATION: 96 % | HEART RATE: 68 BPM | DIASTOLIC BLOOD PRESSURE: 77 MMHG | RESPIRATION RATE: 15 BRPM | SYSTOLIC BLOOD PRESSURE: 138 MMHG | WEIGHT: 168.87 LBS | HEIGHT: 62 IN
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
## 2024-09-05 DIAGNOSIS — S16.1XXA STRAIN OF NECK MUSCLE, INITIAL ENCOUNTER: ICD-10-CM

## 2024-09-05 DIAGNOSIS — S29.019A ACUTE THORACIC MYOFASCIAL STRAIN, INITIAL ENCOUNTER: ICD-10-CM

## 2024-09-05 PROCEDURE — 72128 CT CHEST SPINE W/O DYE: CPT

## 2024-09-05 PROCEDURE — 72125 CT NECK SPINE W/O DYE: CPT

## 2024-09-05 PROCEDURE — 99284 EMERGENCY DEPT VISIT MOD MDM: CPT

## 2024-09-05 PROCEDURE — 6370000000 HC RX 637 (ALT 250 FOR IP): Performed by: EMERGENCY MEDICINE

## 2024-09-05 PROCEDURE — 70450 CT HEAD/BRAIN W/O DYE: CPT

## 2024-09-05 RX ORDER — LIDOCAINE 4 G/G
1 PATCH TOPICAL DAILY PRN
Qty: 15 PATCH | Refills: 0 | Status: SHIPPED | OUTPATIENT
Start: 2024-09-05 | End: 2024-09-20

## 2024-09-05 RX ORDER — ACETAMINOPHEN 325 MG/1
650 TABLET ORAL ONCE
Status: COMPLETED | OUTPATIENT
Start: 2024-09-05 | End: 2024-09-05

## 2024-09-05 RX ADMIN — ACETAMINOPHEN 325MG 650 MG: 325 TABLET ORAL at 10:40

## 2024-09-05 ASSESSMENT — PAIN DESCRIPTION - ORIENTATION: ORIENTATION: MID

## 2024-09-05 ASSESSMENT — PAIN DESCRIPTION - DESCRIPTORS
DESCRIPTORS: ACHING
DESCRIPTORS: ACHING

## 2024-09-05 ASSESSMENT — LIFESTYLE VARIABLES
HOW MANY STANDARD DRINKS CONTAINING ALCOHOL DO YOU HAVE ON A TYPICAL DAY: PATIENT DOES NOT DRINK
HOW OFTEN DO YOU HAVE A DRINK CONTAINING ALCOHOL: NEVER

## 2024-09-05 ASSESSMENT — PAIN DESCRIPTION - LOCATION
LOCATION: BACK
LOCATION: BACK

## 2024-09-05 ASSESSMENT — PAIN SCALES - GENERAL
PAINLEVEL_OUTOF10: 8
PAINLEVEL_OUTOF10: 4

## 2024-09-05 ASSESSMENT — PAIN DESCRIPTION - ONSET: ONSET: ON-GOING

## 2024-09-05 ASSESSMENT — PAIN DESCRIPTION - PAIN TYPE: TYPE: ACUTE PAIN

## 2024-09-05 ASSESSMENT — PAIN - FUNCTIONAL ASSESSMENT
PAIN_FUNCTIONAL_ASSESSMENT: ACTIVITIES ARE NOT PREVENTED
PAIN_FUNCTIONAL_ASSESSMENT: 0-10
PAIN_FUNCTIONAL_ASSESSMENT: PREVENTS OR INTERFERES SOME ACTIVE ACTIVITIES AND ADLS

## 2024-09-05 ASSESSMENT — PAIN DESCRIPTION - FREQUENCY: FREQUENCY: CONTINUOUS

## 2024-09-05 NOTE — DISCHARGE INSTRUCTIONS
Call today or tomorrow to follow up with Chelsea Funes DO  in 2-3 days.    Use Tylenol (unless prescribed medications that have Tylenol in it) for pain.      Soak in a hot shower or bath tub.  You will have more aches and pains tomorrow, but should feel better in several days.    Return to the Emergency Department for worsening of pain, decrease sensation to arms or legs, inability to move arms or legs, shortness of breath, severe chest pain, excessive nausea or vomiting, notice any bruising to your abdomen or have increase in abdominal pain, any other care or concern.
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
